# Patient Record
Sex: MALE | Race: WHITE | NOT HISPANIC OR LATINO | Employment: OTHER | URBAN - METROPOLITAN AREA
[De-identification: names, ages, dates, MRNs, and addresses within clinical notes are randomized per-mention and may not be internally consistent; named-entity substitution may affect disease eponyms.]

---

## 2023-06-28 ENCOUNTER — OFFICE VISIT (OUTPATIENT)
Dept: PRIMARY CARE | Facility: CLINIC | Age: 73
End: 2023-06-28
Payer: MEDICARE

## 2023-06-28 VITALS
WEIGHT: 162 LBS | SYSTOLIC BLOOD PRESSURE: 126 MMHG | DIASTOLIC BLOOD PRESSURE: 78 MMHG | HEART RATE: 74 BPM | HEIGHT: 70 IN | RESPIRATION RATE: 15 BRPM | TEMPERATURE: 98.2 F | BODY MASS INDEX: 23.19 KG/M2

## 2023-06-28 DIAGNOSIS — N52.9 ERECTILE DYSFUNCTION, UNSPECIFIED ERECTILE DYSFUNCTION TYPE: ICD-10-CM

## 2023-06-28 DIAGNOSIS — I10 BENIGN ESSENTIAL HTN: Primary | ICD-10-CM

## 2023-06-28 DIAGNOSIS — E11.9 TYPE 2 DIABETES MELLITUS WITHOUT COMPLICATION, WITHOUT LONG-TERM CURRENT USE OF INSULIN (MULTI): ICD-10-CM

## 2023-06-28 DIAGNOSIS — L23.7 POISON IVY DERMATITIS: ICD-10-CM

## 2023-06-28 DIAGNOSIS — R35.1 NOCTURIA: ICD-10-CM

## 2023-06-28 DIAGNOSIS — E78.5 DYSLIPIDEMIA: ICD-10-CM

## 2023-06-28 PROCEDURE — 3078F DIAST BP <80 MM HG: CPT | Performed by: INTERNAL MEDICINE

## 2023-06-28 PROCEDURE — 99214 OFFICE O/P EST MOD 30 MIN: CPT | Performed by: INTERNAL MEDICINE

## 2023-06-28 PROCEDURE — 3074F SYST BP LT 130 MM HG: CPT | Performed by: INTERNAL MEDICINE

## 2023-06-28 PROCEDURE — 1159F MED LIST DOCD IN RCRD: CPT | Performed by: INTERNAL MEDICINE

## 2023-06-28 PROCEDURE — 83036 HEMOGLOBIN GLYCOSYLATED A1C: CPT | Performed by: INTERNAL MEDICINE

## 2023-06-28 PROCEDURE — 1036F TOBACCO NON-USER: CPT | Performed by: INTERNAL MEDICINE

## 2023-06-28 PROCEDURE — 84153 ASSAY OF PSA TOTAL: CPT | Performed by: INTERNAL MEDICINE

## 2023-06-28 RX ORDER — SIMVASTATIN 40 MG/1
1 TABLET, FILM COATED ORAL EVERY EVENING
COMMUNITY
Start: 2021-01-19 | End: 2023-09-21

## 2023-06-28 RX ORDER — METFORMIN HYDROCHLORIDE 500 MG/1
1 TABLET, EXTENDED RELEASE ORAL DAILY
COMMUNITY
Start: 2020-11-10 | End: 2023-12-02 | Stop reason: SDUPTHER

## 2023-06-28 RX ORDER — METHYLPREDNISOLONE 4 MG/1
TABLET ORAL
Qty: 21 TABLET | Refills: 0 | Status: SHIPPED | OUTPATIENT
Start: 2023-06-28 | End: 2023-07-05

## 2023-06-28 RX ORDER — SILDENAFIL 50 MG/1
TABLET, FILM COATED ORAL
COMMUNITY
End: 2024-03-22 | Stop reason: SDUPTHER

## 2023-06-28 RX ORDER — TRIAMCINOLONE ACETONIDE 1 MG/G
OINTMENT TOPICAL 2 TIMES DAILY PRN
Qty: 30 G | Refills: 1 | Status: SHIPPED | OUTPATIENT
Start: 2023-06-28 | End: 2023-10-26

## 2023-06-28 RX ORDER — BISOPROLOL FUMARATE 10 MG/1
10 TABLET, FILM COATED ORAL DAILY
Qty: 30 TABLET | Refills: 3 | Status: SHIPPED | OUTPATIENT
Start: 2023-06-28 | End: 2023-08-24

## 2023-06-28 RX ORDER — NAPROXEN SODIUM 220 MG/1
TABLET ORAL
COMMUNITY
Start: 2021-01-19 | End: 2023-12-28 | Stop reason: ALTCHOICE

## 2023-06-28 ASSESSMENT — PAIN SCALES - GENERAL: PAINLEVEL: 0-NO PAIN

## 2023-06-28 NOTE — PROGRESS NOTES
Subjective   Bradly Infante is a 73 y.o. male who presents for patient is here for follow-up.  HPI  Bradly is 73-year-old male with known history of benign essential hypertension type 2 diabetes dyslipidemia erectile dysfunction and nocturia patient is here for follow-up voices no major complaint denies chest pain shortness of breath fever chills nausea vomiting constipation diarrhea dysuria urgency frequency, takes medication as prescribed.Fell Saturday missed a step climbing SUV left hill and hip pain. Pulling weeds now with Poison Ivy  Review of Systems  10 system review pertinent as above  Objective     Visit Vitals  /78   Pulse 74   Temp 36.8 °C (98.2 °F)   Resp 15      Physical Exam  HEENT: Atraumatic normocephalic the pupils are equal and round and reactive to light the sclerae nonicteric extraocular motion are intact.  Neck: Is supple without JVD no carotid bruits the trachea is midline there are no masses pulses are equal and bilateral with normal upstroke.  Skin: Normal.  Skin good texture.  Moist.  Good turgor.  No lesions, no rashes.  Lymph: No lymphadenopathy appreciated, no masses, no lesions  Lungs: Are clear to auscultation and percussion, good breath sounds bilaterally, no rhonchi, no wheezing, good diaphragmatic excursion.  Heart: Normal rate and normal rhythm S1, S2, no S3, no gallop, murmur or rub.  Abdomen: Soft, nontender, no organomegaly, good bowel sounds.    Extremities: Full range of motion, good pulses bilateral.  No cyanosis, no clubbing or edema.  Neuro: Cranial nerves II-XII are grossly intact there is no sensory or motor deficits.  Able to move all extremities.      Assessment/Plan     Here for follow-up fasting blood works    CBC BMP lipids AST ALT vitamin D 25-hydroxy  PSA    Colonoscopy completed February 2021  Cardiac calcium score compared to January 2021 low    Immunizations    Continue with the low-fat, low-cholesterol diet,  I recommended Mediterranean diet, which include  fish, chicken, vegetables and olive oil  Exercise daily for 30 minutes at least 3 times a week  Continue home medications    Diabetes  Continue your current medications  Remain on a Low carbohydrate diet, exercise daily  Check a sugar before breakfast and before dinner  On metformin take prn on his own     Hypertension  No added salt diet, do not and salt to your food  Try to exercise every other day for 30 minutes  Continue current medications    Poison Ivy dermatitis  Medrol dose pack  Triamcinolone cream     Problem List Items Addressed This Visit       Benign essential HTN - Primary    Relevant Medications    bisoprolol (Zebeta) 10 mg tablet    DM type 2 (diabetes mellitus, type 2) (CMS/[facility])    Dyslipidemia    Erectile dysfunction         Clay Otero MD

## 2023-07-07 ENCOUNTER — TELEPHONE (OUTPATIENT)
Dept: PRIMARY CARE | Facility: CLINIC | Age: 73
End: 2023-07-07
Payer: MEDICARE

## 2023-07-10 ENCOUNTER — TELEPHONE (OUTPATIENT)
Dept: PRIMARY CARE | Facility: CLINIC | Age: 73
End: 2023-07-10
Payer: MEDICARE

## 2023-08-24 DIAGNOSIS — I10 BENIGN ESSENTIAL HTN: ICD-10-CM

## 2023-08-24 RX ORDER — BISOPROLOL FUMARATE 10 MG/1
10 TABLET, FILM COATED ORAL DAILY
Qty: 90 TABLET | Refills: 2 | Status: SHIPPED | OUTPATIENT
Start: 2023-08-24 | End: 2024-03-15

## 2023-09-21 DIAGNOSIS — E11.9 TYPE 2 DIABETES MELLITUS WITHOUT COMPLICATION, WITHOUT LONG-TERM CURRENT USE OF INSULIN (MULTI): Primary | ICD-10-CM

## 2023-09-21 DIAGNOSIS — E78.5 DYSLIPIDEMIA: ICD-10-CM

## 2023-09-21 RX ORDER — SIMVASTATIN 40 MG/1
40 TABLET, FILM COATED ORAL EVERY EVENING
Qty: 90 TABLET | Refills: 3 | Status: SHIPPED | OUTPATIENT
Start: 2023-09-21 | End: 2024-03-22 | Stop reason: SDUPTHER

## 2023-11-28 NOTE — PROGRESS NOTES
Subjective   Bradly Infante is a 73 y.o. male who presents for patient is here for follow-up and Medicare 1.  HPI  Bradly is 73-year-old male with known history of benign essential hypertension type 2 diabetes dyslipidemia erectile dysfunction and nocturia patient is here for follow-up voices no major complaint denies chest pain shortness of breath fever chills nausea vomiting constipation diarrhea dysuria urgency frequency, takes medication as prescribed.Complaining of syncope while helping cleaning after thanks giving at his sons house, Syncope  and collapse no recollection of event though was helped to the couch at his own accord.      Review of Systems  10 system review pertinent as above  Objective     There were no vitals taken for this visit.     Physical Exam  HEENT: Atraumatic normocephalic the pupils are equal and round and reactive to light the sclerae nonicteric extraocular motion are intact.  Neck: Is supple without JVD no carotid bruits the trachea is midline there are no masses pulses are equal and bilateral with normal upstroke.  Skin: Normal.  Skin good texture.  Moist.  Good turgor.  No lesions, no rashes.  Lymph: No lymphadenopathy appreciated, no masses, no lesions  Lungs: Are clear to auscultation and percussion, good breath sounds bilaterally, no rhonchi, no wheezing, good diaphragmatic excursion.  Heart: Normal rate and normal rhythm S1, S2, no S3, no gallop, murmur or rub.  Abdomen: Soft, nontender, no organomegaly, good bowel sounds.    Extremities: Full range of motion, good pulses bilateral.  No cyanosis, no clubbing or edema.  Neuro: Cranial nerves II-XII are grossly intact there is no sensory or motor deficits.  Able to move all extremities.      Assessment/Plan     Is here for follow-up and fasting blood work     CBC BMP lipids AST ALT vitamin D 25-hydroxy    Syncope and collapse  Possible dehydrated in the setting  Of Polydipsia polyuria type 2 diabetes (occasionally forgets  Metformin)  Out for a few seconds  No recollection of event  TTE, ZIO, Carotid us, BMP Mag TSH    ECG sinus bradycardia  Holter monitor ordered    Losing weight  In the setting of polydipsia and polyuria  I suspect poorly controlled glucose   Will check BMP A!C  Currently on Metformin  mg at Bedtime ( diarrhea with 3 times daily did not try twice)  Farxiga Rx sent in will take if Glucose is High    Colonoscopy completed February 2021  Cardiac calcium score compared to January 2021 low    Immunizations    Continue with the low-fat, low-cholesterol diet,  I recommended Mediterranean diet, which include fish, chicken, vegetables and olive oil  Exercise daily for 30 minutes at least 3 times a week  Continue home medications    Diabetes  Continue your current medications  Remain on a Low carbohydrate diet, exercise daily  Check a sugar before breakfast and before dinner  On metformin take prn on his own     Hypertension  No added salt diet, do not and salt to your food  Try to exercise every other day for 30 minutes  Continue current medications    Poison Ivy dermatitis  Medrol dose pack  Triamcinolone cream     Problem List Items Addressed This Visit       DM type 2 (diabetes mellitus, type 2) (CMS/[facility])    Relevant Medications    dapagliflozin propanediol (Farxiga) 10 mg    Other Relevant Orders    Hemoglobin A1C     Other Visit Diagnoses       Syncope and collapse    -  Primary    Relevant Orders    Transthoracic Echo (TTE) Complete    Vascular US carotid artery duplex bilateral    Holter or Event Cardiac Monitor    Basic Metabolic Panel    Magnesium    TSH    ECG 12 lead (Clinic Performed)    Tiredness        Relevant Orders    TSH    Routine general medical examination at health care facility                Clay Otero MD

## 2023-11-30 ENCOUNTER — OFFICE VISIT (OUTPATIENT)
Dept: PRIMARY CARE | Facility: CLINIC | Age: 73
End: 2023-11-30
Payer: MEDICARE

## 2023-11-30 VITALS — BODY MASS INDEX: 22.05 KG/M2 | HEIGHT: 70 IN | WEIGHT: 154 LBS

## 2023-11-30 DIAGNOSIS — Z00.00 ROUTINE GENERAL MEDICAL EXAMINATION AT HEALTH CARE FACILITY: ICD-10-CM

## 2023-11-30 DIAGNOSIS — R55 SYNCOPE AND COLLAPSE: Primary | ICD-10-CM

## 2023-11-30 DIAGNOSIS — E11.9 TYPE 2 DIABETES MELLITUS WITHOUT COMPLICATION, WITHOUT LONG-TERM CURRENT USE OF INSULIN (MULTI): ICD-10-CM

## 2023-11-30 DIAGNOSIS — R53.83 TIREDNESS: ICD-10-CM

## 2023-11-30 LAB — MAGNESIUM SERPL-MCNC: 2.28 MG/DL (ref 1.6–2.4)

## 2023-11-30 PROCEDURE — 36415 COLL VENOUS BLD VENIPUNCTURE: CPT

## 2023-11-30 PROCEDURE — 84443 ASSAY THYROID STIM HORMONE: CPT | Performed by: INTERNAL MEDICINE

## 2023-11-30 PROCEDURE — 1170F FXNL STATUS ASSESSED: CPT | Performed by: INTERNAL MEDICINE

## 2023-11-30 PROCEDURE — 3046F HEMOGLOBIN A1C LEVEL >9.0%: CPT | Performed by: INTERNAL MEDICINE

## 2023-11-30 PROCEDURE — 99214 OFFICE O/P EST MOD 30 MIN: CPT | Performed by: INTERNAL MEDICINE

## 2023-11-30 PROCEDURE — 93000 ELECTROCARDIOGRAM COMPLETE: CPT | Performed by: INTERNAL MEDICINE

## 2023-11-30 PROCEDURE — 80048 BASIC METABOLIC PNL TOTAL CA: CPT | Performed by: INTERNAL MEDICINE

## 2023-11-30 PROCEDURE — 1126F AMNT PAIN NOTED NONE PRSNT: CPT | Performed by: INTERNAL MEDICINE

## 2023-11-30 PROCEDURE — G0439 PPPS, SUBSEQ VISIT: HCPCS | Performed by: INTERNAL MEDICINE

## 2023-11-30 PROCEDURE — 1036F TOBACCO NON-USER: CPT | Performed by: INTERNAL MEDICINE

## 2023-11-30 PROCEDURE — 1159F MED LIST DOCD IN RCRD: CPT | Performed by: INTERNAL MEDICINE

## 2023-11-30 PROCEDURE — 83735 ASSAY OF MAGNESIUM: CPT

## 2023-11-30 RX ORDER — DAPAGLIFLOZIN 10 MG/1
10 TABLET, FILM COATED ORAL DAILY
Qty: 30 TABLET | Refills: 1 | Status: SHIPPED | OUTPATIENT
Start: 2023-11-30 | End: 2023-12-19 | Stop reason: ALTCHOICE

## 2023-11-30 ASSESSMENT — ANXIETY QUESTIONNAIRES
IF YOU CHECKED OFF ANY PROBLEMS ON THIS QUESTIONNAIRE, HOW DIFFICULT HAVE THESE PROBLEMS MADE IT FOR YOU TO DO YOUR WORK, TAKE CARE OF THINGS AT HOME, OR GET ALONG WITH OTHER PEOPLE: NOT DIFFICULT AT ALL
5. BEING SO RESTLESS THAT IT IS HARD TO SIT STILL: NOT AT ALL
4. TROUBLE RELAXING: NOT AT ALL
1. FEELING NERVOUS, ANXIOUS, OR ON EDGE: NOT AT ALL
GAD7 TOTAL SCORE: 0
3. WORRYING TOO MUCH ABOUT DIFFERENT THINGS: NOT AT ALL
7. FEELING AFRAID AS IF SOMETHING AWFUL MIGHT HAPPEN: NOT AT ALL
2. NOT BEING ABLE TO STOP OR CONTROL WORRYING: NOT AT ALL
6. BECOMING EASILY ANNOYED OR IRRITABLE: NOT AT ALL

## 2023-11-30 ASSESSMENT — ACTIVITIES OF DAILY LIVING (ADL)
TOILETING: INDEPENDENT
TOILETING: INDEPENDENT
PREPARING MEALS: INDEPENDENT
NEEDS ASSISTANCE WITH FOOD: INDEPENDENT
FEEDING: INDEPENDENT
DRESSING: INDEPENDENT
HEARING - RIGHT EAR: FUNCTIONAL
HEARING - LEFT EAR: FUNCTIONAL
USING TELEPHONE: INDEPENDENT
FEEDING YOURSELF: INDEPENDENT
PILL BOX USED: NO
GROOMING: INDEPENDENT
DRESSING YOURSELF: INDEPENDENT
EATING: INDEPENDENT
PATIENT'S MEMORY ADEQUATE TO SAFELY COMPLETE DAILY ACTIVITIES?: YES
DOING HOUSEWORK: INDEPENDENT
JUDGMENT_ADEQUATE_SAFELY_COMPLETE_DAILY_ACTIVITIES: YES
GROCERY SHOPPING: INDEPENDENT
USING TRANSPORTATION: INDEPENDENT
BATHING: INDEPENDENT
JUDGMENT_ADEQUATE_SAFELY_COMPLETE_DAILY_ACTIVITIES: YES
STIL DRIVING: YES
BATHING: INDEPENDENT
WALKS IN HOME: INDEPENDENT
TAKING MEDICATION: INDEPENDENT
ADEQUATE_TO_COMPLETE_ADL: YES
ADEQUATE_TO_COMPLETE_ADL: YES

## 2023-11-30 ASSESSMENT — GERIATRIC MINI NUTRITIONAL ASSESSMENT (MNA)
B WEIGHT LOSS DURING THE LAST 3 MONTHS: NO WEIGHT LOSS
D HAS SUFFERED PSYCHOLOGICAL STRESS OR ACUTE DISEASE IN THE PAST 3 MONTHS?: NO
C GENERAL MOBILITY: GOES OUT

## 2023-11-30 ASSESSMENT — COLUMBIA-SUICIDE SEVERITY RATING SCALE - C-SSRS
2. HAVE YOU ACTUALLY HAD ANY THOUGHTS OF KILLING YOURSELF?: NO
1. IN THE PAST MONTH, HAVE YOU WISHED YOU WERE DEAD OR WISHED YOU COULD GO TO SLEEP AND NOT WAKE UP?: NO
6. HAVE YOU EVER DONE ANYTHING, STARTED TO DO ANYTHING, OR PREPARED TO DO ANYTHING TO END YOUR LIFE?: NO

## 2023-11-30 ASSESSMENT — ENCOUNTER SYMPTOMS
OCCASIONAL FEELINGS OF UNSTEADINESS: 0
DEPRESSION: 0
LOSS OF SENSATION IN FEET: 0

## 2023-11-30 ASSESSMENT — PATIENT HEALTH QUESTIONNAIRE - PHQ9
SUM OF ALL RESPONSES TO PHQ9 QUESTIONS 1 AND 2: 0
2. FEELING DOWN, DEPRESSED OR HOPELESS: NOT AT ALL
1. LITTLE INTEREST OR PLEASURE IN DOING THINGS: NOT AT ALL

## 2023-11-30 NOTE — PROGRESS NOTES
"Subjective   Reason for Visit: Bradly Infante is an 73 y.o. male here for a Medicare Wellness visit.          Reviewed all medications by prescribing practitioner or clinical pharmacist (such as prescriptions, OTCs, herbal therapies and supplements) and documented in the medical record.    HPI  Bradly is 73, lives at home independently, self-sufficient takes his medication as prescribed, is a recent episode of a fall versus syncope, denies fever chills cough nausea vomiting, denies additional history of falling except for 1 time over Thanksgiving 2023.  Patient Care Team:  Clay Otero MD as PCP - General (Internal Medicine)  Clay Otero MD as PCP - [employer] PCP     Review of Systems  10 systems reviewed  Objective   Vitals:  Ht 1.778 m (5' 10\")   Wt 69.9 kg (154 lb)   BMI 22.10 kg/m²       Physical Exam  HEENT: Atraumatic normocephalic the pupils are equal and round and reactive to light the sclerae nonicteric extraocular motion are intact.  Neck: Is supple without JVD no carotid bruits the trachea is midline there are no masses pulses are equal and bilateral with normal upstroke.  Skin: Normal.  Skin good texture.  Moist.  Good turgor.  No lesions, no rashes.  Lymph: No lymphadenopathy appreciated, no masses, no lesions  Lungs: Are clear to auscultation and percussion, good breath sounds bilaterally, no rhonchi, no wheezing, good diaphragmatic excursion.  Heart: Normal rate and normal rhythm S1, S2, no S3, no gallop, murmur or rub.  Abdomen: Soft, nontender, no organomegaly, good bowel sounds.    Extremities: Full range of motion, good pulses bilateral.  No cyanosis, no clubbing or edema.  Neuro: Cranial nerves II-XII are grossly intact there is no sensory or motor deficits.  Able to move all extremities.  Assessment/Plan     Medicare wellness  Problem List Items Addressed This Visit       DM type 2 (diabetes mellitus, type 2) (CMS/[facility])    Relevant Medications    dapagliflozin propanediol " (Farxiga) 10 mg    Other Relevant Orders    Hemoglobin A1C     Other Visit Diagnoses       Syncope and collapse    -  Primary    Relevant Orders    Transthoracic Echo (TTE) Complete    Vascular US carotid artery duplex bilateral    Holter or Event Cardiac Monitor    Basic Metabolic Panel    Magnesium    TSH    ECG 12 lead (Clinic Performed)    Tiredness        Relevant Orders    TSH

## 2023-12-01 PROCEDURE — 36415 COLL VENOUS BLD VENIPUNCTURE: CPT

## 2023-12-01 PROCEDURE — 83036 HEMOGLOBIN GLYCOSYLATED A1C: CPT

## 2023-12-02 DIAGNOSIS — E11.9 TYPE 2 DIABETES MELLITUS WITHOUT COMPLICATION, WITHOUT LONG-TERM CURRENT USE OF INSULIN (MULTI): Primary | ICD-10-CM

## 2023-12-02 LAB
EST. AVERAGE GLUCOSE BLD GHB EST-MCNC: 255 MG/DL
HBA1C MFR BLD: 10.5 %

## 2023-12-02 RX ORDER — METFORMIN HYDROCHLORIDE 500 MG/1
1000 TABLET, EXTENDED RELEASE ORAL DAILY
Qty: 60 TABLET | Refills: 11 | Status: SHIPPED | OUTPATIENT
Start: 2023-12-02 | End: 2024-01-04

## 2023-12-05 ENCOUNTER — HOSPITAL ENCOUNTER (OUTPATIENT)
Dept: CARDIOLOGY | Facility: HOSPITAL | Age: 73
Discharge: HOME | End: 2023-12-05
Payer: MEDICARE

## 2023-12-05 DIAGNOSIS — R55 SYNCOPE AND COLLAPSE: ICD-10-CM

## 2023-12-05 PROCEDURE — 93242 EXT ECG>48HR<7D RECORDING: CPT

## 2023-12-07 ENCOUNTER — HOSPITAL ENCOUNTER (OUTPATIENT)
Dept: RADIOLOGY | Facility: HOSPITAL | Age: 73
Discharge: HOME | End: 2023-12-07
Payer: MEDICARE

## 2023-12-07 DIAGNOSIS — R55 SYNCOPE AND COLLAPSE: ICD-10-CM

## 2023-12-07 PROCEDURE — 93880 EXTRACRANIAL BILAT STUDY: CPT

## 2023-12-08 ENCOUNTER — HOSPITAL ENCOUNTER (OUTPATIENT)
Dept: CARDIOLOGY | Facility: HOSPITAL | Age: 73
Discharge: HOME | End: 2023-12-08
Payer: MEDICARE

## 2023-12-08 DIAGNOSIS — R55 SYNCOPE AND COLLAPSE: ICD-10-CM

## 2023-12-08 PROCEDURE — 93306 TTE W/DOPPLER COMPLETE: CPT

## 2023-12-08 PROCEDURE — 93306 TTE W/DOPPLER COMPLETE: CPT | Performed by: INTERNAL MEDICINE

## 2023-12-14 LAB
AORTIC VALVE PEAK VELOCITY: 1.24
AV PEAK GRADIENT: 6.2
AVA (PEAK VEL): 2.27
EJECTION FRACTION APICAL 4 CHAMBER: 58.1
LEFT ATRIUM VOLUME AREA LENGTH INDEX BSA: 20.1
LEFT VENTRICLE INTERNAL DIMENSION DIASTOLE: 3.91 (ref 3.5–6)
LEFT VENTRICULAR OUTFLOW TRACT DIAMETER: 1.9
MITRAL VALVE E/A RATIO: 1.13
MITRAL VALVE E/E' RATIO: 9.95
RIGHT VENTRICLE FREE WALL PEAK S': 14.4
RIGHT VENTRICLE PEAK SYSTOLIC PRESSURE: 26.2
TRICUSPID ANNULAR PLANE SYSTOLIC EXCURSION: 2.3

## 2023-12-19 NOTE — PROGRESS NOTES
Patient is instructed to stop Farxiga, take Jardiance.  Continue metformin.  And come in for fasting blood work.

## 2023-12-27 ENCOUNTER — APPOINTMENT (OUTPATIENT)
Dept: PRIMARY CARE | Facility: CLINIC | Age: 73
End: 2023-12-27
Payer: MEDICARE

## 2023-12-28 ENCOUNTER — OFFICE VISIT (OUTPATIENT)
Dept: PRIMARY CARE | Facility: CLINIC | Age: 73
End: 2023-12-28
Payer: MEDICARE

## 2023-12-28 ENCOUNTER — APPOINTMENT (OUTPATIENT)
Dept: PRIMARY CARE | Facility: CLINIC | Age: 73
End: 2023-12-28
Payer: MEDICARE

## 2023-12-28 VITALS
TEMPERATURE: 98.1 F | HEIGHT: 70 IN | HEART RATE: 78 BPM | RESPIRATION RATE: 16 BRPM | SYSTOLIC BLOOD PRESSURE: 122 MMHG | DIASTOLIC BLOOD PRESSURE: 80 MMHG | WEIGHT: 155 LBS | BODY MASS INDEX: 22.19 KG/M2

## 2023-12-28 DIAGNOSIS — E78.5 DYSLIPIDEMIA: ICD-10-CM

## 2023-12-28 DIAGNOSIS — I10 BENIGN ESSENTIAL HTN: ICD-10-CM

## 2023-12-28 DIAGNOSIS — E11.9 TYPE 2 DIABETES MELLITUS WITHOUT COMPLICATION, WITHOUT LONG-TERM CURRENT USE OF INSULIN (MULTI): Primary | ICD-10-CM

## 2023-12-28 DIAGNOSIS — I10 PRIMARY HYPERTENSION: ICD-10-CM

## 2023-12-28 PROCEDURE — 1036F TOBACCO NON-USER: CPT | Performed by: INTERNAL MEDICINE

## 2023-12-28 PROCEDURE — 3079F DIAST BP 80-89 MM HG: CPT | Performed by: INTERNAL MEDICINE

## 2023-12-28 PROCEDURE — 80048 BASIC METABOLIC PNL TOTAL CA: CPT | Performed by: INTERNAL MEDICINE

## 2023-12-28 PROCEDURE — 1126F AMNT PAIN NOTED NONE PRSNT: CPT | Performed by: INTERNAL MEDICINE

## 2023-12-28 PROCEDURE — 1159F MED LIST DOCD IN RCRD: CPT | Performed by: INTERNAL MEDICINE

## 2023-12-28 PROCEDURE — 3046F HEMOGLOBIN A1C LEVEL >9.0%: CPT | Performed by: INTERNAL MEDICINE

## 2023-12-28 PROCEDURE — 99214 OFFICE O/P EST MOD 30 MIN: CPT | Performed by: INTERNAL MEDICINE

## 2023-12-28 PROCEDURE — 3074F SYST BP LT 130 MM HG: CPT | Performed by: INTERNAL MEDICINE

## 2023-12-28 ASSESSMENT — PAIN SCALES - GENERAL: PAINLEVEL: 0-NO PAIN

## 2023-12-28 ASSESSMENT — ENCOUNTER SYMPTOMS
LOSS OF SENSATION IN FEET: 0
OCCASIONAL FEELINGS OF UNSTEADINESS: 0
DEPRESSION: 0

## 2023-12-31 DIAGNOSIS — E11.9 TYPE 2 DIABETES MELLITUS WITHOUT COMPLICATION, WITHOUT LONG-TERM CURRENT USE OF INSULIN (MULTI): ICD-10-CM

## 2024-01-04 RX ORDER — METFORMIN HYDROCHLORIDE 500 MG/1
500 TABLET, EXTENDED RELEASE ORAL DAILY
Qty: 90 TABLET | Refills: 3 | Status: SHIPPED | OUTPATIENT
Start: 2024-01-04

## 2024-03-14 DIAGNOSIS — I10 BENIGN ESSENTIAL HTN: ICD-10-CM

## 2024-03-15 RX ORDER — BISOPROLOL FUMARATE 10 MG/1
10 TABLET, FILM COATED ORAL DAILY
Qty: 90 TABLET | Refills: 3 | Status: SHIPPED | OUTPATIENT
Start: 2024-03-15

## 2024-03-20 NOTE — PROGRESS NOTES
Subjective   Bradly Infante is a 73 y.o. male who presents for patient is here for diabetes follow-up blood work  HPI  Bradly is 73-year-old male with known history of benign essential hypertension type 2 diabetes dyslipidemia erectile dysfunction and nocturia patient is here for follow-up voices no major complaint denies chest pain shortness of breath fever chills nausea vomiting constipation diarrhea dysuria urgency frequency, takes medication as prescribed.Complaining of syncope while helping cleaning after thanks giving at his sons house, Syncope  and collapse no recollection of event though was helped to the couch at his own accord.      Review of Systems  10 system review pertinent as above  Objective     Visit Vitals  /82   Pulse 78   Temp 36.5 °C (97.7 °F)   Resp 15          Physical Exam  HEENT: Atraumatic normocephalic the pupils are equal and round and reactive to light the sclerae nonicteric extraocular motion are intact.  Neck: Is supple without JVD no carotid bruits the trachea is midline there are no masses pulses are equal and bilateral with normal upstroke.  Skin: Normal.  Skin good texture.  Moist.  Good turgor.  No lesions, no rashes.  Lymph: No lymphadenopathy appreciated, no masses, no lesions  Lungs: Are clear to auscultation and percussion, good breath sounds bilaterally, no rhonchi, no wheezing, good diaphragmatic excursion.  Heart: Normal rate and normal rhythm S1, S2, no S3, no gallop, murmur or rub.  Abdomen: Soft, nontender, no organomegaly, good bowel sounds.    Extremities: Full range of motion, good pulses bilateral.  No cyanosis, no clubbing or edema.  Neuro: Cranial nerves II-XII are grossly intact there is no sensory or motor deficits.  Able to move all extremities.      Assessment/Plan     Is here for follow-up and fasting blood work       BMP  A1c  LIPID    PSA 06/2023 next Misa 3 2024  Colonoscopy 2021 Feb tubular Adenoma repeat in 5 years Ie Feb 2026      Diabetes  Continue your current medications  Remain on a Low carbohydrate diet, exercise daily  Check a sugar before breakfast and before dinner      Weight loss in the setting of diabetes I  Metformin  BID  Jardiance 10 mg daily    Colonoscopy completed February 2021  Cardiac calcium score compared to January 2021 low    Immunizations    Continue with the low-fat, low-cholesterol diet,  I recommended Mediterranean diet, which include fish, chicken, vegetables and olive oil  Exercise daily for 30 minutes at least 3 times a week  Continue home medications    Diabetes  Continue your current medications  Remain on a Low carbohydrate diet, exercise daily  Check a sugar before breakfast and before dinner  On metformin take prn on his own   A1c 10.5% 12/01/2023    Hypertension  No added salt diet, do not and salt to your food  Try to exercise every other day for 30 minutes  Continue current medications    Poison Ivy dermatitis  Medrol dose pack  Triamcinolone cream     Problem List Items Addressed This Visit       Benign essential HTN - Primary    DM type 2 (diabetes mellitus, type 2) (CMS/[facility])    Relevant Medications    empagliflozin (Jardiance) 10 mg    Other Relevant Orders    Basic Metabolic Panel    Hemoglobin A1C    Dyslipidemia    Relevant Medications    simvastatin (Zocor) 40 mg tablet    Other Relevant Orders    Lipid Panel    Erectile dysfunction    Relevant Medications    sildenafil (Viagra) 100 mg tablet    Hypertensive disorder     Clay Otero MD

## 2024-03-22 ENCOUNTER — OFFICE VISIT (OUTPATIENT)
Dept: PRIMARY CARE | Facility: CLINIC | Age: 74
End: 2024-03-22
Payer: MEDICARE

## 2024-03-22 VITALS
HEART RATE: 78 BPM | SYSTOLIC BLOOD PRESSURE: 126 MMHG | HEIGHT: 70 IN | WEIGHT: 156 LBS | RESPIRATION RATE: 15 BRPM | BODY MASS INDEX: 22.33 KG/M2 | DIASTOLIC BLOOD PRESSURE: 82 MMHG | TEMPERATURE: 97.7 F

## 2024-03-22 DIAGNOSIS — I10 PRIMARY HYPERTENSION: ICD-10-CM

## 2024-03-22 DIAGNOSIS — E78.5 DYSLIPIDEMIA: ICD-10-CM

## 2024-03-22 DIAGNOSIS — E11.9 TYPE 2 DIABETES MELLITUS WITHOUT COMPLICATION, WITHOUT LONG-TERM CURRENT USE OF INSULIN (MULTI): ICD-10-CM

## 2024-03-22 DIAGNOSIS — I10 BENIGN ESSENTIAL HTN: Primary | ICD-10-CM

## 2024-03-22 DIAGNOSIS — N52.9 ERECTILE DYSFUNCTION, UNSPECIFIED ERECTILE DYSFUNCTION TYPE: ICD-10-CM

## 2024-03-22 PROCEDURE — 3074F SYST BP LT 130 MM HG: CPT | Performed by: INTERNAL MEDICINE

## 2024-03-22 PROCEDURE — 3079F DIAST BP 80-89 MM HG: CPT | Performed by: INTERNAL MEDICINE

## 2024-03-22 PROCEDURE — 1159F MED LIST DOCD IN RCRD: CPT | Performed by: INTERNAL MEDICINE

## 2024-03-22 PROCEDURE — 80061 LIPID PANEL: CPT | Performed by: INTERNAL MEDICINE

## 2024-03-22 PROCEDURE — 83036 HEMOGLOBIN GLYCOSYLATED A1C: CPT | Performed by: INTERNAL MEDICINE

## 2024-03-22 PROCEDURE — 99214 OFFICE O/P EST MOD 30 MIN: CPT | Performed by: INTERNAL MEDICINE

## 2024-03-22 PROCEDURE — 1036F TOBACCO NON-USER: CPT | Performed by: INTERNAL MEDICINE

## 2024-03-22 PROCEDURE — 80048 BASIC METABOLIC PNL TOTAL CA: CPT | Performed by: INTERNAL MEDICINE

## 2024-03-22 PROCEDURE — 1126F AMNT PAIN NOTED NONE PRSNT: CPT | Performed by: INTERNAL MEDICINE

## 2024-03-22 RX ORDER — SIMVASTATIN 40 MG/1
40 TABLET, FILM COATED ORAL EVERY EVENING
Qty: 90 TABLET | Refills: 3 | Status: SHIPPED | OUTPATIENT
Start: 2024-03-22

## 2024-03-22 RX ORDER — SILDENAFIL 100 MG/1
100 TABLET, FILM COATED ORAL DAILY PRN
Qty: 30 TABLET | Refills: 1 | Status: SHIPPED | OUTPATIENT
Start: 2024-03-22 | End: 2024-05-21

## 2024-03-22 RX ORDER — SILDENAFIL 50 MG/1
100 TABLET, FILM COATED ORAL AS NEEDED
Qty: 30 TABLET | Refills: 0 | Status: SHIPPED | OUTPATIENT
Start: 2024-03-22 | End: 2024-03-22 | Stop reason: ALTCHOICE

## 2024-03-22 ASSESSMENT — PAIN SCALES - GENERAL: PAINLEVEL: 0-NO PAIN

## 2024-03-22 ASSESSMENT — ENCOUNTER SYMPTOMS
DEPRESSION: 0
OCCASIONAL FEELINGS OF UNSTEADINESS: 0
LOSS OF SENSATION IN FEET: 0

## 2024-04-02 DIAGNOSIS — E11.9 TYPE 2 DIABETES MELLITUS WITHOUT COMPLICATION, WITHOUT LONG-TERM CURRENT USE OF INSULIN (MULTI): ICD-10-CM

## 2024-06-19 NOTE — PROGRESS NOTES
Subjective   Bradly Infante is a 74 y.o. male who presents for patient is here for diabetes follow-up .  WINDY Abdullahi is 74-year-old male with known history of benign essential hypertension type 2 diabetes dyslipidemia erectile dysfunction and nocturia patient is here for follow-up voices no major complaint denies chest pain shortness of breath fever chills nausea vomiting constipation diarrhea dysuria urgency frequency, takes medication as prescribed. Patient is working out 3 X per week, plays [employer]        Review of Systems  10 system review pertinent as above  Objective     Visit Vitals  /78   Pulse 78   Temp 36.5 °C (97.7 °F)   Resp 16        Physical Exam  HEENT: Atraumatic normocephalic the pupils are equal and round and reactive to light the sclerae nonicteric extraocular motion are intact.  Neck: Is supple without JVD no carotid bruits the trachea is midline there are no masses pulses are equal and bilateral with normal upstroke.  Skin: Normal.  Skin good texture.  Moist.  Good turgor.  No lesions, no rashes.  Lymph: No lymphadenopathy appreciated, no masses, no lesions  Lungs: Are clear to auscultation and percussion, good breath sounds bilaterally, no rhonchi, no wheezing, good diaphragmatic excursion.  Heart: Normal rate and normal rhythm S1, S2, no S3, no gallop, murmur or rub.  Abdomen: Soft, nontender, no organomegaly, good bowel sounds.    Extremities: Full range of motion, good pulses bilateral.  No cyanosis, no clubbing or edema.  Neuro: Cranial nerves II-XII are grossly intact there is no sensory or motor deficits.  Able to move all extremities.      Assessment/Plan     Follow up  Patient is fasting  Blood work    BMP  A1c  LIPID    PSA 06/2023 next Misa 3 2024  Colonoscopy 2021 Feb tubular Adenoma repeat in 5 years Ie Feb 2026     Diabetes  Continue your current medications  Remain on a Low carbohydrate diet, exercise daily  Check a sugar before breakfast and before  dinner    DM2  Weight loss in the setting of diabetes I  Metformin  BID  Jardiance 10 mg daily    Colonoscopy completed February 2021  Cardiac calcium score compared to January 2021 low    Immunizations    Continue with the low-fat, low-cholesterol diet,  I recommended Mediterranean diet, which include fish, chicken, vegetables and olive oil  Exercise daily for 30 minutes at least 3 times a week  Continue home medications  CACS 5.6 02/02/21     Diabetes  Continue your current medications  Remain on a Low carbohydrate diet, exercise daily  Check a sugar before breakfast and before dinner  On metformin take prn on his own   A1c 10.5% 12/01/2023    Hypertension  No added salt diet, do not and salt to your food  Try to exercise every other day for 30 minutes  Continue current medications    Poison Ivy dermatitis  Medrol dose pack  Triamcinolone cream     Problem List Items Addressed This Visit       Benign essential HTN    Relevant Orders    CBC    DM type 2 (diabetes mellitus, type 2) (Multi) - Primary    Relevant Orders    Basic Metabolic Panel    Hemoglobin A1C    CBC    PSA    Dyslipidemia    Relevant Orders    AST    ALT    Lipid Panel    Nocturia     Other Visit Diagnoses       Nocturia associated with benign prostatic hyperplasia        Relevant Orders    PSA            Clay Otero MD

## 2024-06-24 ENCOUNTER — APPOINTMENT (OUTPATIENT)
Dept: PRIMARY CARE | Facility: CLINIC | Age: 74
End: 2024-06-24
Payer: MEDICARE

## 2024-06-24 VITALS
SYSTOLIC BLOOD PRESSURE: 122 MMHG | RESPIRATION RATE: 16 BRPM | BODY MASS INDEX: 22.05 KG/M2 | WEIGHT: 154 LBS | HEART RATE: 78 BPM | DIASTOLIC BLOOD PRESSURE: 78 MMHG | HEIGHT: 70 IN | TEMPERATURE: 97.7 F

## 2024-06-24 DIAGNOSIS — R35.1 NOCTURIA: ICD-10-CM

## 2024-06-24 DIAGNOSIS — R35.1 NOCTURIA ASSOCIATED WITH BENIGN PROSTATIC HYPERPLASIA: ICD-10-CM

## 2024-06-24 DIAGNOSIS — E11.9 TYPE 2 DIABETES MELLITUS WITHOUT COMPLICATION, WITHOUT LONG-TERM CURRENT USE OF INSULIN (MULTI): Primary | ICD-10-CM

## 2024-06-24 DIAGNOSIS — E78.5 DYSLIPIDEMIA: ICD-10-CM

## 2024-06-24 DIAGNOSIS — N40.1 NOCTURIA ASSOCIATED WITH BENIGN PROSTATIC HYPERPLASIA: ICD-10-CM

## 2024-06-24 DIAGNOSIS — I10 BENIGN ESSENTIAL HTN: ICD-10-CM

## 2024-06-24 PROCEDURE — 1036F TOBACCO NON-USER: CPT | Performed by: INTERNAL MEDICINE

## 2024-06-24 PROCEDURE — 80048 BASIC METABOLIC PNL TOTAL CA: CPT | Performed by: INTERNAL MEDICINE

## 2024-06-24 PROCEDURE — 84153 ASSAY OF PSA TOTAL: CPT | Performed by: INTERNAL MEDICINE

## 2024-06-24 PROCEDURE — 83036 HEMOGLOBIN GLYCOSYLATED A1C: CPT | Performed by: INTERNAL MEDICINE

## 2024-06-24 PROCEDURE — 80061 LIPID PANEL: CPT | Performed by: INTERNAL MEDICINE

## 2024-06-24 PROCEDURE — 1126F AMNT PAIN NOTED NONE PRSNT: CPT | Performed by: INTERNAL MEDICINE

## 2024-06-24 PROCEDURE — 99214 OFFICE O/P EST MOD 30 MIN: CPT | Performed by: INTERNAL MEDICINE

## 2024-06-24 PROCEDURE — 3074F SYST BP LT 130 MM HG: CPT | Performed by: INTERNAL MEDICINE

## 2024-06-24 PROCEDURE — 84450 TRANSFERASE (AST) (SGOT): CPT | Performed by: INTERNAL MEDICINE

## 2024-06-24 PROCEDURE — G2211 COMPLEX E/M VISIT ADD ON: HCPCS | Performed by: INTERNAL MEDICINE

## 2024-06-24 PROCEDURE — 1159F MED LIST DOCD IN RCRD: CPT | Performed by: INTERNAL MEDICINE

## 2024-06-24 PROCEDURE — 3078F DIAST BP <80 MM HG: CPT | Performed by: INTERNAL MEDICINE

## 2024-06-24 PROCEDURE — 85025 COMPLETE CBC W/AUTO DIFF WBC: CPT | Performed by: INTERNAL MEDICINE

## 2024-06-24 PROCEDURE — 84460 ALANINE AMINO (ALT) (SGPT): CPT | Performed by: INTERNAL MEDICINE

## 2024-06-24 ASSESSMENT — PAIN SCALES - GENERAL: PAINLEVEL: 0-NO PAIN

## 2024-06-24 ASSESSMENT — ENCOUNTER SYMPTOMS
DEPRESSION: 0
OCCASIONAL FEELINGS OF UNSTEADINESS: 0
LOSS OF SENSATION IN FEET: 0

## 2024-07-26 NOTE — PROGRESS NOTES
Subjective   Bradly Infante is a 74 y.o. male who presents for patient is here for diabetes Muscle and joint pain .  HPI  Bradly is 74-year-old male with known history of benign essential hypertension type 2 diabetes dyslipidemia erectile dysfunction and nocturia patient is here for follow-up voices no major complaint denies chest pain shortness of breath fever chills nausea vomiting constipation diarrhea dysuria urgency frequency, takes medication as prescribed. Patient was working out 3 X per week, plays [employer] ball now down to once a week, muscle and joint pain.        Review of Systems  10 system review pertinent as above  Objective     Visit Vitals  /82   Pulse 78   Temp 36.6 °C (97.9 °F)   Resp 15          Physical Exam  HEENT: Atraumatic normocephalic the pupils are equal and round and reactive to light the sclerae nonicteric extraocular motion are intact.  Neck: Is supple without JVD no carotid bruits the trachea is midline there are no masses pulses are equal and bilateral with normal upstroke.  Skin: Normal.  Skin good texture.  Moist.  Good turgor.  No lesions, no rashes.  Lymph: No lymphadenopathy appreciated, no masses, no lesions  Lungs: Are clear to auscultation and percussion, good breath sounds bilaterally, no rhonchi, no wheezing, good diaphragmatic excursion.  Heart: Normal rate and normal rhythm S1, S2, no S3, no gallop, murmur or rub.  Abdomen: Soft, nontender, no organomegaly, good bowel sounds.    Extremities: Full range of motion, good pulses bilateral.  No cyanosis, no clubbing or edema.  Neuro: Cranial nerves II-XII are grossly intact there is no sensory or motor deficits.  Able to move all extremities.      Assessment/Plan     Follow up  Muscle pains aches  Most likely Due to statins  Stop Statins ( Simvastatin)  Take Coq10 200 mg daily   Stop whenpain is gone      RX for Rapatha  In the settings  Of   Statin induced Myositis  Dyslipidemia  DM2  Pos CACS Score    PSA 06/2023 next  Misa 3 2024  Colonoscopy 2021 Feb tubular Adenoma repeat in 5 years Ie Feb 2026     Diabetes  Continue your current medications  Remain on a Low carbohydrate diet, exercise daily  Check a sugar before breakfast and before dinner    DM2  Weight loss in the setting of diabetes I  Metformin  BID  Jardiance 10 mg daily    Colonoscopy completed February 2021  Cardiac calcium score compared to January 2021 low    Immunizations    Continue with the low-fat, low-cholesterol diet,  I recommended Mediterranean diet, which include fish, chicken, vegetables and olive oil  Exercise daily for 30 minutes at least 3 times a week  Continue home medications  CACS 5.6 02/02/21     Diabetes  Continue your current medications  Remain on a Low carbohydrate diet, exercise daily  Check a sugar before breakfast and before dinner  On metformin take prn on his own   A1c 10.5% 12/01/2023    Hypertension  No added salt diet, do not and salt to your food  Try to exercise every other day for 30 minutes  Continue current medications    Poison Ivy dermatitis  Medrol dose pack  Triamcinolone cream     Problem List Items Addressed This Visit       Dyslipidemia - Primary    Relevant Medications    evolocumab (Repatha SureClick) 140 mg/mL injection     Other Visit Diagnoses       Statin-induced myositis        Relevant Medications    evolocumab (Repatha SureClick) 140 mg/mL injection    Elevated coronary artery calcium score        Relevant Medications    evolocumab (Repatha SureClick) 140 mg/mL injection              Clay Otero MD

## 2024-07-30 ENCOUNTER — APPOINTMENT (OUTPATIENT)
Dept: PRIMARY CARE | Facility: CLINIC | Age: 74
End: 2024-07-30
Payer: MEDICARE

## 2024-07-30 VITALS
TEMPERATURE: 97.9 F | DIASTOLIC BLOOD PRESSURE: 82 MMHG | HEART RATE: 78 BPM | SYSTOLIC BLOOD PRESSURE: 124 MMHG | BODY MASS INDEX: 21.47 KG/M2 | HEIGHT: 70 IN | WEIGHT: 150 LBS | RESPIRATION RATE: 15 BRPM

## 2024-07-30 DIAGNOSIS — T46.6X5A STATIN-INDUCED MYOSITIS: ICD-10-CM

## 2024-07-30 DIAGNOSIS — E78.5 DYSLIPIDEMIA: Primary | ICD-10-CM

## 2024-07-30 DIAGNOSIS — E11.9 TYPE 2 DIABETES MELLITUS WITHOUT COMPLICATION, WITHOUT LONG-TERM CURRENT USE OF INSULIN (MULTI): ICD-10-CM

## 2024-07-30 DIAGNOSIS — M60.9 STATIN-INDUCED MYOSITIS: ICD-10-CM

## 2024-07-30 DIAGNOSIS — R93.1 ELEVATED CORONARY ARTERY CALCIUM SCORE: ICD-10-CM

## 2024-07-30 PROCEDURE — 1126F AMNT PAIN NOTED NONE PRSNT: CPT | Performed by: INTERNAL MEDICINE

## 2024-07-30 PROCEDURE — 3074F SYST BP LT 130 MM HG: CPT | Performed by: INTERNAL MEDICINE

## 2024-07-30 PROCEDURE — 99214 OFFICE O/P EST MOD 30 MIN: CPT | Performed by: INTERNAL MEDICINE

## 2024-07-30 PROCEDURE — 1159F MED LIST DOCD IN RCRD: CPT | Performed by: INTERNAL MEDICINE

## 2024-07-30 PROCEDURE — 1036F TOBACCO NON-USER: CPT | Performed by: INTERNAL MEDICINE

## 2024-07-30 PROCEDURE — 3079F DIAST BP 80-89 MM HG: CPT | Performed by: INTERNAL MEDICINE

## 2024-07-30 RX ORDER — EVOLOCUMAB 140 MG/ML
140 INJECTION, SOLUTION SUBCUTANEOUS
Qty: 2 EACH | Refills: 3 | Status: SHIPPED | OUTPATIENT
Start: 2024-07-30

## 2024-07-30 RX ORDER — EVOLOCUMAB 140 MG/ML
140 INJECTION, SOLUTION SUBCUTANEOUS
Qty: 2 EACH | Refills: 3 | Status: SHIPPED | OUTPATIENT
Start: 2024-07-30 | End: 2024-07-30

## 2024-07-30 ASSESSMENT — ENCOUNTER SYMPTOMS
LOSS OF SENSATION IN FEET: 0
DEPRESSION: 0
OCCASIONAL FEELINGS OF UNSTEADINESS: 0

## 2024-07-30 ASSESSMENT — PAIN SCALES - GENERAL: PAINLEVEL: 0-NO PAIN

## 2024-08-20 ENCOUNTER — TELEPHONE (OUTPATIENT)
Dept: PHARMACY | Facility: HOSPITAL | Age: 74
End: 2024-08-20
Payer: MEDICARE

## 2024-08-20 NOTE — TELEPHONE ENCOUNTER
Population Health: Outreach by Ambulatory Pharmacy Team    Payor: [employer] [place]  Reason: Adherence  Medication: Simvastatin 40mg  Outcome: Left Voicemail    EARNEST HENRY

## 2024-08-20 NOTE — TELEPHONE ENCOUNTER
Patient returned call, had muscle pains with simvastatin and was temporarily taken off of it. Looking to change meds potentially to a different statin, decrease statin dose, or change to another cholesterol med.    Humaira Glover, PharmD

## 2024-08-20 NOTE — TELEPHONE ENCOUNTER
I reviewed the telephone encounter and agree with the student’s findings and plans as written. Case discussed with student.    Humaira Glover, PharmD

## 2024-09-17 DIAGNOSIS — E11.9 TYPE 2 DIABETES MELLITUS WITHOUT COMPLICATION, WITHOUT LONG-TERM CURRENT USE OF INSULIN (MULTI): ICD-10-CM

## 2024-09-17 NOTE — PROGRESS NOTES
Subjective   Bradly Infante is a 74 y.o. male who presents for patient is here for diabetes Muscle and joint pain .  HPI  Bradly is 74-year-old male with known history of benign essential hypertension type 2 diabetes dyslipidemia erectile dysfunction and nocturia patient is here for follow-up voices no major complaint denies chest pain shortness of breath fever chills nausea vomiting constipation diarrhea dysuria urgency frequency, takes medication as prescribed. Patient was working out 3 X per week, plays [employer] ball now down to once a week, muscle and joint pain.         Review of Systems  10 system review pertinent as above  Objective     There were no vitals taken for this visit.         Physical Exam  HEENT: Atraumatic normocephalic the pupils are equal and round and reactive to light the sclerae nonicteric extraocular motion are intact.  Neck: Is supple without JVD no carotid bruits the trachea is midline there are no masses pulses are equal and bilateral with normal upstroke.  Skin: Normal.  Skin good texture.  Moist.  Good turgor.  No lesions, no rashes.  Lymph: No lymphadenopathy appreciated, no masses, no lesions  Lungs: Are clear to auscultation and percussion, good breath sounds bilaterally, no rhonchi, no wheezing, good diaphragmatic excursion.  Heart: Normal rate and normal rhythm S1, S2, no S3, no gallop, murmur or rub.  Abdomen: Soft, nontender, no organomegaly, good bowel sounds.    Extremities: Full range of motion, good pulses bilateral.  No cyanosis, no clubbing or edema.  Neuro: Cranial nerves II-XII are grossly intact there is no sensory or motor deficits.  Able to move all extremities.      Assessment/Plan     Shoulder pain  Rotator cuff Tendinis  Had surgery Dr Dudley 2004 Circa  Pain is worse in am     Statin induced mycostasis  Off Crestor  Will try Zetia     RX for Rapatha  In the settings  Of   Statin induced Myositis  Dyslipidemia  DM2  CACS Score 5.6  Tried rapatha costly will try  Zetia    PSA 06/2023 next Misa 3 2024  Colonoscopy 2021 Feb tubular Adenoma repeat in 5 years Ie Feb 2026     Diabetes A1c 10.5 (12/2023), 7.8 (06/2024)  Continue your current medications  Remain on a Low carbohydrate diet, exercise daily  Check a sugar before breakfast and before dinner    DM2  Weight loss in the setting of diabetes I  Metformin  BID  Jardiance 10 mg daily  A1c 7.4% 06/2024 improved   Stable blood glucose reported by patient  140's     Colonoscopy completed February 2021  Cardiac calcium score compared to January 2021 low    Immunizations    Continue with the low-fat, low-cholesterol diet,  I recommended Mediterranean diet, which include fish, chicken, vegetables and olive oil  Exercise daily for 30 minutes at least 3 times a week  Continue home medications  CACS 5.6 02/02/21     Diabetes  Continue your current medications  Remain on a Low carbohydrate diet, exercise daily  Check a sugar before breakfast and before dinner  On metformin take prn on his own   A1c 10.5% 12/01/2023    Hypertension  No added salt diet, do not and salt to your food  Try to exercise every other day for 30 minutes  Continue current medications    Poison Ivy dermatitis  Medrol dose pack  Triamcinolone cream     Problem List Items Addressed This Visit    None          Clay Otero MD

## 2024-09-18 RX ORDER — METFORMIN HYDROCHLORIDE 500 MG/1
500 TABLET, EXTENDED RELEASE ORAL DAILY
Qty: 100 TABLET | Refills: 2 | Status: SHIPPED | OUTPATIENT
Start: 2024-09-18

## 2024-09-19 ENCOUNTER — APPOINTMENT (OUTPATIENT)
Dept: PRIMARY CARE | Facility: CLINIC | Age: 74
End: 2024-09-19
Payer: MEDICARE

## 2024-09-19 VITALS
BODY MASS INDEX: 22.22 KG/M2 | TEMPERATURE: 97.5 F | SYSTOLIC BLOOD PRESSURE: 124 MMHG | WEIGHT: 150 LBS | HEART RATE: 78 BPM | RESPIRATION RATE: 16 BRPM | HEIGHT: 69 IN | DIASTOLIC BLOOD PRESSURE: 82 MMHG

## 2024-09-19 DIAGNOSIS — T46.6X5A STATIN-INDUCED MYOSITIS: ICD-10-CM

## 2024-09-19 DIAGNOSIS — M75.82 TENDINITIS OF LEFT ROTATOR CUFF: ICD-10-CM

## 2024-09-19 DIAGNOSIS — I10 BENIGN ESSENTIAL HTN: ICD-10-CM

## 2024-09-19 DIAGNOSIS — E78.5 DYSLIPIDEMIA: Primary | ICD-10-CM

## 2024-09-19 DIAGNOSIS — I10 PRIMARY HYPERTENSION: ICD-10-CM

## 2024-09-19 DIAGNOSIS — M60.9 STATIN-INDUCED MYOSITIS: ICD-10-CM

## 2024-09-19 DIAGNOSIS — E11.9 TYPE 2 DIABETES MELLITUS WITHOUT COMPLICATION, WITHOUT LONG-TERM CURRENT USE OF INSULIN (MULTI): ICD-10-CM

## 2024-09-19 PROCEDURE — 3079F DIAST BP 80-89 MM HG: CPT | Performed by: INTERNAL MEDICINE

## 2024-09-19 PROCEDURE — 90662 IIV NO PRSV INCREASED AG IM: CPT | Performed by: INTERNAL MEDICINE

## 2024-09-19 PROCEDURE — G2211 COMPLEX E/M VISIT ADD ON: HCPCS | Performed by: INTERNAL MEDICINE

## 2024-09-19 PROCEDURE — 99214 OFFICE O/P EST MOD 30 MIN: CPT | Performed by: INTERNAL MEDICINE

## 2024-09-19 PROCEDURE — 3074F SYST BP LT 130 MM HG: CPT | Performed by: INTERNAL MEDICINE

## 2024-09-19 PROCEDURE — 1036F TOBACCO NON-USER: CPT | Performed by: INTERNAL MEDICINE

## 2024-09-19 PROCEDURE — G0008 ADMIN INFLUENZA VIRUS VAC: HCPCS | Performed by: INTERNAL MEDICINE

## 2024-09-19 PROCEDURE — 1126F AMNT PAIN NOTED NONE PRSNT: CPT | Performed by: INTERNAL MEDICINE

## 2024-09-19 RX ORDER — EZETIMIBE 10 MG/1
10 TABLET ORAL DAILY
Qty: 30 TABLET | Refills: 11 | Status: SHIPPED | OUTPATIENT
Start: 2024-09-19 | End: 2025-09-19

## 2024-09-19 ASSESSMENT — PAIN SCALES - GENERAL: PAINLEVEL: 0-NO PAIN

## 2024-09-19 NOTE — PROGRESS NOTES
Subjective   Patient ID: Bradly Infante is a 74 y.o. male    Chief Complaint: No chief complaint on file.       Last Surgery: No surgery found  Date of Last Surgery: No surgery found    HPI  Bradly Infante is a 74 y.o. right hand dominant male presenting for left shoulder pain    His left shoulder pain has worsened over the last 6 weeks. Prior to this it was tolerable. He did fall off of a step stool. He is struggling with sleep at night. He enjoys yard work and is avoiding ladders. His shoulder has not improved since his fall. Bench pressing and butterflies are painful.     He is a diabetic and is on medication for this.     Objective   Patient is a well-developed, well nourished male in no acute distress. Awake, alert and oriented x3, with appropriate mood. Breathes with normal chest rises. Eye exam reveals round pupils with clear sclera. Gait is steady.    Patient had full range of motion of bilateral elbows. 5 out of 5 bilateral wrist flexion and extension. Thumb extension positive bilaterally. Sensation was intact to light touch to median, ulnar, radial, and axillary nerves bilaterally. Positive radial pulse bilaterally. Cervical lymphadenopathy examined, and this was negative.    Examination of the right shoulder today reveals the skin to be intact. There is no sign any atrophy lesions or abrasions. There is no pain to palpation of the bony prominences. Range of motion of the right shoulder revealed 0-170° of forward elevation. 0-60° of external rotation. Internal rotation was to T12. Provocative maneuvers today were negative.    Examination of the left shoulder reveals the skin to be intact. No sign any atrophy lesions or abrasions.  Active range of motion is 130° of forward elevation, passively correctable.  50° of external rotation. Internal rotation was to lower lumbar region. Abduction to 0-160°. Pain with Stanton's testing. Mildly positive Hawkin's     Imaging:  X-rays of the of the left shoulder taken today  ordered and personally interpreted by me show no signs of any fracture, dislocation, arthritis or proximal humerus migration. Some changes to the greater tuberosity. No os acromiale     Assessment/Plan   No diagnosis found.  Patient with an acute injury to his left rotator cuff after a fall from a ladder    At this time, we had a very long discussion with the patient regarding the diagnosis. Rotator cuff disease is a spectrum of disorders ranging from rotator cuff tendinitis to full-thickness rotator cuff tears. We know that some patients over the age of 50 will have symptomatic rotator cuff tears just due to overuse and general wear and tear. In general, most patients who come in with complaints such as these will get better with therapy and injections alone. The therapy should be performed 2-3 times a day and should take about 10-15 minutes to perform each time.       We will order an MRI to evaluate the rotator cuff. We know from literature that early detection of acute rotator cuff tears leads to improved outcomes in healing and functionality. They have weakness that is increased after the injury and have not regained their range of motion. As a result, we will order a STAT MRI and will discuss the results over the phone.     Hopefully he does not have a tear but I am concerned given his weakness and pain after the injury. We have ordered an MRI and he is going to do exercises in the meantime.     No orders of the defined types were placed in this encounter.        Follow up once MRI is completed.     Scribe Attestation  By signing my name below, IZita Scribe   attest that this documentation has been prepared under the direction and in the presence of Chip Blunt MD.

## 2024-09-20 ENCOUNTER — OFFICE VISIT (OUTPATIENT)
Dept: ORTHOPEDIC SURGERY | Facility: CLINIC | Age: 74
End: 2024-09-20
Payer: MEDICARE

## 2024-09-20 ENCOUNTER — HOSPITAL ENCOUNTER (OUTPATIENT)
Dept: RADIOLOGY | Facility: CLINIC | Age: 74
Discharge: HOME | End: 2024-09-20
Payer: MEDICARE

## 2024-09-20 ENCOUNTER — APPOINTMENT (OUTPATIENT)
Dept: RADIOLOGY | Facility: CLINIC | Age: 74
End: 2024-09-20
Payer: MEDICARE

## 2024-09-20 DIAGNOSIS — M75.82 TENDINITIS OF LEFT ROTATOR CUFF: ICD-10-CM

## 2024-09-20 DIAGNOSIS — M75.102 TEAR OF LEFT ROTATOR CUFF, UNSPECIFIED TEAR EXTENT, UNSPECIFIED WHETHER TRAUMATIC: Primary | ICD-10-CM

## 2024-09-20 PROCEDURE — 99204 OFFICE O/P NEW MOD 45 MIN: CPT | Performed by: ORTHOPAEDIC SURGERY

## 2024-09-20 PROCEDURE — 73030 X-RAY EXAM OF SHOULDER: CPT | Mod: LT

## 2024-09-24 ENCOUNTER — HOSPITAL ENCOUNTER (OUTPATIENT)
Dept: RADIOLOGY | Facility: CLINIC | Age: 74
Discharge: HOME | End: 2024-09-24
Payer: MEDICARE

## 2024-09-24 DIAGNOSIS — M75.102 TEAR OF LEFT ROTATOR CUFF, UNSPECIFIED TEAR EXTENT, UNSPECIFIED WHETHER TRAUMATIC: ICD-10-CM

## 2024-09-24 PROCEDURE — 73221 MRI JOINT UPR EXTREM W/O DYE: CPT | Mod: LT

## 2024-09-24 PROCEDURE — 73221 MRI JOINT UPR EXTREM W/O DYE: CPT | Mod: LEFT SIDE | Performed by: RADIOLOGY

## 2024-10-04 ENCOUNTER — DOCUMENTATION (OUTPATIENT)
Dept: ORTHOPEDIC SURGERY | Facility: HOSPITAL | Age: 74
End: 2024-10-04
Payer: MEDICARE

## 2024-10-04 NOTE — PROGRESS NOTES
Reviewed mri, no full thickness rc tear, recommend injection and pt to get shoulder back to normal. Surgery last resort

## 2024-10-16 ENCOUNTER — APPOINTMENT (OUTPATIENT)
Dept: PRIMARY CARE | Facility: CLINIC | Age: 74
End: 2024-10-16
Payer: MEDICARE

## 2024-10-16 NOTE — PROGRESS NOTES
Subjective   Bradly Infante is a 74 y.o. male who presents for patient is here for follow up.    HPI  Bradly is 74-year-old male with known history of benign essential hypertension type 2 diabetes dyslipidemia erectile dysfunction and nocturia patient is here for follow-up voices no major complaint denies chest pain shortness of breath fever chills nausea vomiting constipation diarrhea dysuria urgency frequency, takes medication as prescribed.   Patient was having severe muscle pain, we stopped statins now much improved.       Review of Systems  10 system review pertinent as above  Objective     There were no vitals taken for this visit.         Physical Exam  HEENT: Atraumatic normocephalic the pupils are equal and round and reactive to light the sclerae nonicteric extraocular motion are intact.  Neck: Is supple without JVD no carotid bruits the trachea is midline there are no masses pulses are equal and bilateral with normal upstroke.  Skin: Normal.  Skin good texture.  Moist.  Good turgor.  No lesions, no rashes.  Lymph: No lymphadenopathy appreciated, no masses, no lesions  Lungs: Are clear to auscultation and percussion, good breath sounds bilaterally, no rhonchi, no wheezing, good diaphragmatic excursion.  Heart: Normal rate and normal rhythm S1, S2, no S3, no gallop, murmur or rub.  Abdomen: Soft, nontender, no organomegaly, good bowel sounds.    Extremities: Full range of motion, good pulses bilateral.  No cyanosis, no clubbing or edema.  Neuro: Cranial nerves II-XII are grossly intact there is no sensory or motor deficits.  Able to move all extremities.      Assessment/Plan     In for physical exam    Fasting blood work    CBC BMP lipids AST ALT vitamin D 25-hydroxy    Immunization  High-dose flu vaccine 09/2024  Pneumonia vaccine Next 2025  Shingles vaccine Shingles patient states had but no record  ( pharmacy [facility])    Prevention  Colonoscopy  PSA    Shoulder pain Lt  Rotator cuff  Gabriel  Had surgery Dr Dudley 2004 Circa  Pain is worse in am   Will follow with Ortho shoulder Dr Conde    Statin induced mycostasis  Off Crestor  We tried PCS K 9 not covered too expensive  Now on Zetia       CAD Risk Stratification  Dyslipidemia  DM2  CACS Score 5.6  On  Zetia    PSA 06/2023 next Misa 3 2024  Colonoscopy 2021 Feb tubular Adenoma repeat in 5 years Ie Feb 2026     Diabetes A1c 10.5 (12/2023), 7.8 (06/2024)  Continue your current medications  Remain on a Low carbohydrate diet, exercise daily  Check a sugar before breakfast and before dinner    DM2  Weight loss in the setting of diabetes I  Metformin  BID  Jardiance 10 mg daily  A1c 7.4% 06/2024 improved   Stable blood glucose reported by patient  140's     Colonoscopy completed February 2021  Cardiac calcium score compared to January 2021 low    Immunizations    Continue with the low-fat, low-cholesterol diet,  I recommended Mediterranean diet, which include fish, chicken, vegetables and olive oil  Exercise daily for 30 minutes at least 3 times a week  Continue home medications  CACS 5.6 02/02/21     Diabetes  Continue your current medications  Remain on a Low carbohydrate diet, exercise daily  Check a sugar before breakfast and before dinner  On metformin take prn on his own   A1c 10.5% 12/01/2023    Hypertension  No added salt diet, do not and salt to your food  Try to exercise every other day for 30 minutes  Continue current medications    Poison Ivy dermatitis  Medrol dose pack  Triamcinolone cream     Problem List Items Addressed This Visit    None          Clay Otero MD

## 2024-10-22 ENCOUNTER — APPOINTMENT (OUTPATIENT)
Dept: PRIMARY CARE | Facility: CLINIC | Age: 74
End: 2024-10-22
Payer: MEDICARE

## 2024-10-22 VITALS
TEMPERATURE: 97.9 F | RESPIRATION RATE: 14 BRPM | BODY MASS INDEX: 23.99 KG/M2 | DIASTOLIC BLOOD PRESSURE: 80 MMHG | HEIGHT: 69 IN | WEIGHT: 162 LBS | SYSTOLIC BLOOD PRESSURE: 122 MMHG

## 2024-10-22 DIAGNOSIS — T46.6X5A STATIN-INDUCED MYOSITIS: ICD-10-CM

## 2024-10-22 DIAGNOSIS — M60.9 STATIN-INDUCED MYOSITIS: ICD-10-CM

## 2024-10-22 DIAGNOSIS — I10 BENIGN ESSENTIAL HTN: ICD-10-CM

## 2024-10-22 DIAGNOSIS — Z00.00 ROUTINE GENERAL MEDICAL EXAMINATION AT HEALTH CARE FACILITY: Primary | ICD-10-CM

## 2024-10-22 DIAGNOSIS — E78.5 DYSLIPIDEMIA: ICD-10-CM

## 2024-10-22 DIAGNOSIS — E11.9 TYPE 2 DIABETES MELLITUS WITHOUT COMPLICATION, WITHOUT LONG-TERM CURRENT USE OF INSULIN (MULTI): ICD-10-CM

## 2024-10-22 DIAGNOSIS — R93.1 ELEVATED CORONARY ARTERY CALCIUM SCORE: ICD-10-CM

## 2024-10-22 LAB
ALT SERPL W P-5'-P-CCNC: 20 U/L (ref 14–59)
ANION GAP SERPL CALC-SCNC: 16 MMOL/L (ref 10–20)
AST SERPL W P-5'-P-CCNC: 11 U/L (ref 15–37)
BUN SERPL-MCNC: 20 MG/DL (ref 7–18)
CALCIUM SERPL-MCNC: 9.3 MG/DL (ref 8.5–10.1)
CHLORIDE SERPL-SCNC: 101 MMOL/L (ref 98–107)
CHOLEST SERPL-MCNC: 173 MG/DL (ref 0–199)
CHOLESTEROL/HDL RATIO: 3 (ref 4.2–7)
CO2 SERPL-SCNC: 25 MMOL/L (ref 21–32)
CREAT SERPL-MCNC: 0.94 MG/DL (ref 0.6–1.1)
EGFRCR SERPLBLD CKD-EPI 2021: 84 ML/MIN/1.73M*2
GLUCOSE SERPL-MCNC: 133 MG/DL (ref 74–100)
HBA1C MFR BLD: 6.6 %
HDLC SERPL-MCNC: 58 MG/DL (ref 40–59)
IS PATIENT FASTING: YES
LDLC SERPL DIRECT ASSAY-MCNC: 95 MG/DL (ref 0–100)
POTASSIUM SERPL-SCNC: 4.6 MMOL/L (ref 3.5–5.1)
SODIUM SERPL-SCNC: 137 MMOL/L (ref 136–145)
TRIGL SERPL-MCNC: 135 MG/DL

## 2024-10-22 ASSESSMENT — ANXIETY QUESTIONNAIRES
IF YOU CHECKED OFF ANY PROBLEMS ON THIS QUESTIONNAIRE, HOW DIFFICULT HAVE THESE PROBLEMS MADE IT FOR YOU TO DO YOUR WORK, TAKE CARE OF THINGS AT HOME, OR GET ALONG WITH OTHER PEOPLE: NOT DIFFICULT AT ALL
6. BECOMING EASILY ANNOYED OR IRRITABLE: NOT AT ALL
4. TROUBLE RELAXING: NOT AT ALL
5. BEING SO RESTLESS THAT IT IS HARD TO SIT STILL: NOT AT ALL
2. NOT BEING ABLE TO STOP OR CONTROL WORRYING: NOT AT ALL
GAD7 TOTAL SCORE: 0
1. FEELING NERVOUS, ANXIOUS, OR ON EDGE: NOT AT ALL
3. WORRYING TOO MUCH ABOUT DIFFERENT THINGS: NOT AT ALL
7. FEELING AFRAID AS IF SOMETHING AWFUL MIGHT HAPPEN: NOT AT ALL

## 2024-10-22 ASSESSMENT — ACTIVITIES OF DAILY LIVING (ADL)
WALKS IN HOME: INDEPENDENT
ADEQUATE_TO_COMPLETE_ADL: YES
STIL DRIVING: YES
MANAGING FINANCES: INDEPENDENT
TAKING MEDICATION: INDEPENDENT
DOING HOUSEWORK: INDEPENDENT
FEEDING YOURSELF: INDEPENDENT
PREPARING MEALS: INDEPENDENT
PILL BOX USED: NO
NEEDS ASSISTANCE WITH FOOD: INDEPENDENT
BATHING: INDEPENDENT
USING TRANSPORTATION: INDEPENDENT
TOILETING: INDEPENDENT
EATING: INDEPENDENT
HEARING - LEFT EAR: FUNCTIONAL
PATIENT'S MEMORY ADEQUATE TO SAFELY COMPLETE DAILY ACTIVITIES?: YES
JUDGMENT_ADEQUATE_SAFELY_COMPLETE_DAILY_ACTIVITIES: YES
GROCERY SHOPPING: INDEPENDENT
HEARING - RIGHT EAR: FUNCTIONAL
USING TELEPHONE: INDEPENDENT
DRESSING YOURSELF: INDEPENDENT
GROOMING: INDEPENDENT

## 2024-10-22 ASSESSMENT — GERIATRIC MINI NUTRITIONAL ASSESSMENT (MNA)
B WEIGHT LOSS DURING THE LAST 3 MONTHS: NO WEIGHT LOSS
D HAS SUFFERED PSYCHOLOGICAL STRESS OR ACUTE DISEASE IN THE PAST 3 MONTHS?: NO
A HAS FOOD INTAKE DECLINED OVER THE PAST 3 MONTHS DUE TO LOSS OF APPETITE, DIGESTIVE PROBLEMS, CHEWING OR SWALLOWING DIFFICULTIES?: NO DECREASE IN FOOD INTAKE
E NEUROPSYCHOLOGICAL PROBLEMS: NO PSYCHOLOGICAL PROBLEMS
C GENERAL MOBILITY: GOES OUT

## 2024-10-22 ASSESSMENT — PATIENT HEALTH QUESTIONNAIRE - PHQ9
1. LITTLE INTEREST OR PLEASURE IN DOING THINGS: NOT AT ALL
SUM OF ALL RESPONSES TO PHQ9 QUESTIONS 1 AND 2: 0
2. FEELING DOWN, DEPRESSED OR HOPELESS: NOT AT ALL

## 2024-10-22 ASSESSMENT — PAIN SCALES - GENERAL: PAINLEVEL_OUTOF10: 0-NO PAIN

## 2024-10-22 ASSESSMENT — ENCOUNTER SYMPTOMS
LOSS OF SENSATION IN FEET: 0
DEPRESSION: 0
OCCASIONAL FEELINGS OF UNSTEADINESS: 0

## 2024-10-22 NOTE — PROGRESS NOTES
"Subjective   Reason for Visit: Bradly Infante is an 74 y.o. male here for a Medicare Wellness visit.  Here for Medicare wellness    HPI  Bradly is 74-year-old male here for Medicare wellness is self-sufficient lives at home with his wife voices no complaints reports no falling takes medication prescribed patient is independent.  Patient Care Team:  Clay Otero MD as PCP - General (Internal Medicine)  Clay Otero MD as PCP - [employer] PCP     Review of Systems  10 system are pertinent as above  Objective   Vitals:  /80   Temp 36.6 °C (97.9 °F)   Resp 14   Ht 1.753 m (5' 9\")   Wt 73.5 kg (162 lb)   BMI 23.92 kg/m²       Physical Exam  HEENT: Atraumatic normocephalic the pupils are equal and round and reactive to light the sclerae nonicteric extraocular motion are intact.  Neck: Is supple without JVD no carotid bruits the trachea is midline there are no masses pulses are equal and bilateral with normal upstroke.  Skin: Normal.  Skin good texture.  Moist.  Good turgor.  No lesions, no rashes.  Lymph: No lymphadenopathy appreciated, no masses, no lesions  Lungs: Are clear to auscultation and percussion, good breath sounds bilaterally, no rhonchi, no wheezing, good diaphragmatic excursion.  Heart: Normal rate and normal rhythm S1, S2, no S3, no gallop, murmur or rub.  Abdomen: Soft, nontender, no organomegaly, good bowel sounds.    Extremities: Full range of motion, good pulses bilateral.  No cyanosis, no clubbing or edema.  Neuro: Cranial nerves II-XII are grossly intact there is no sensory or motor deficits.  Able to move all extremities.  Assessment & Plan  Benign essential HTN    Orders:    Basic Metabolic Panel    Dyslipidemia    Orders:    Lipid Panel    AST    ALT    Elevated coronary artery calcium score         Type 2 diabetes mellitus without complication, without long-term current use of insulin (Multi)    Orders:    Basic Metabolic Panel    Hemoglobin A1C    Statin-induced " myositis         Routine general medical examination at health care facility    Orders:    1 Year Follow Up In Primary Care - Wellness Exam; Future    Medicare wellness

## 2024-11-01 ENCOUNTER — APPOINTMENT (OUTPATIENT)
Dept: ORTHOPEDIC SURGERY | Facility: HOSPITAL | Age: 74
End: 2024-11-01
Payer: MEDICARE

## 2024-11-11 NOTE — PROGRESS NOTES
Subjective   Patient ID: Bradly Infante is a 74 y.o. male    Chief Complaint: No chief complaint on file.       Last Surgery: No surgery found  Date of Last Surgery: No surgery found    HPI  Bradly Infante is a 74 y.o. right hand dominant male presenting for left shoulder pain    His left shoulder pain has worsened over the last 6 weeks. Prior to this it was tolerable. He did fall off of a step stool. He is struggling with sleep at night. He enjoys yard work and is avoiding ladders. His shoulder has not improved since his fall. Bench pressing and butterflies are painful.     He is a diabetic and is on medication for this.     11/11/24  Bradly Infante is a 74 y.o. male returning to the clinic for a follow up visit regarding his left shoulder.    Objective   Patient is a well-developed, well nourished male in no acute distress. Awake, alert and oriented x3, with appropriate mood. Breathes with normal chest rises. Eye exam reveals round pupils with clear sclera. Gait is steady.    Patient had full range of motion of bilateral elbows. 5 out of 5 bilateral wrist flexion and extension. Thumb extension positive bilaterally. Sensation was intact to light touch to median, ulnar, radial, and axillary nerves bilaterally. Positive radial pulse bilaterally. Cervical lymphadenopathy examined, and this was negative.    Examination of the right shoulder today reveals the skin to be intact. There is no sign any atrophy lesions or abrasions. There is no pain to palpation of the bony prominences. Range of motion of the right shoulder revealed 0-170° of forward elevation. 0-60° of external rotation. Internal rotation was to T12. +Weaver    Examination of the left shoulder reveals the skin to be intact. No sign any atrophy lesions or abrasions.  Active range of motion is 120° of forward elevation, passively correctable.  50° of external rotation. Internal rotation was to L-3. Abduction to 0-160°. 4/5 Stanton's testing. +Hawkin's      Imaging:  MRI of the left shoulder taken 09/2024 personally ordered and interpreted by me show partial age related tearing. Some increased signal that is consistent with his fall. Small cyst in the bone.    L Inj/Asp: L subacromial bursa on 11/12/2024 10:51 AM  Indications: pain  Details: 20 G needle, anterior approach  Medications: 40 mg triamcinolone acetonide 40 mg/mL; 4 mL lidocaine 10 mg/mL (1 %)  Outcome: tolerated well, no immediate complications  Procedure, treatment alternatives, risks and benefits explained, specific risks discussed. Consent was given by the patient. Immediately prior to procedure a time out was called to verify the correct patient, procedure, equipment, support staff and site/side marked as required. Patient was prepped and draped in the usual sterile fashion.             Assessment/Plan   No diagnosis found.  Patient with left shoulder pain related to his rotator cuff as a direct result of his fall.   No full thickness tearing on MRI. There is nothing that requires him to have urgent surgery. We are going to try therapy and injections at this time. If he finds no relief with conservative management we will consider surgery further.     At this time, we had a very long discussion with the patient regarding the diagnosis. Rotator cuff disease is a spectrum of disorders ranging from rotator cuff tendinitis to full-thickness rotator cuff tears. We know that some patients over the age of 50 will have symptomatic rotator cuff tears just due to overuse and general wear and tear. In general, most patients who come in with complaints such as these will get better with therapy and injections alone. The therapy should be performed 2-3 times a day and should take about 10-15 minutes to perform each time.       He tolerated the injection well today in the LEFT shoulder. We will see how they do after the injection. They are aware that there can be a spike in glucose levels following an  injection.    If they do not get sustained relief from the injection, consideration for a repeat injection versus advanced imaging of an MRI will be warranted as then they would have failed over 6 weeks of conservative management of injections, therapy, rest and over-the-counter pain medications.      No orders of the defined types were placed in this encounter.        Follow up in 3 months    Scribe Attestation  By signing my name below, IZita Scribe   attest that this documentation has been prepared under the direction and in the presence of Chip Blunt MD.

## 2024-11-12 ENCOUNTER — OFFICE VISIT (OUTPATIENT)
Dept: ORTHOPEDIC SURGERY | Facility: HOSPITAL | Age: 74
End: 2024-11-12
Payer: MEDICARE

## 2024-11-12 DIAGNOSIS — M75.82 TENDINITIS OF LEFT ROTATOR CUFF: Primary | ICD-10-CM

## 2024-11-12 PROCEDURE — 2500000004 HC RX 250 GENERAL PHARMACY W/ HCPCS (ALT 636 FOR OP/ED): Performed by: ORTHOPAEDIC SURGERY

## 2024-11-12 PROCEDURE — 99214 OFFICE O/P EST MOD 30 MIN: CPT | Performed by: ORTHOPAEDIC SURGERY

## 2024-11-12 PROCEDURE — 20610 DRAIN/INJ JOINT/BURSA W/O US: CPT | Mod: LT | Performed by: ORTHOPAEDIC SURGERY

## 2024-11-12 RX ADMIN — LIDOCAINE HYDROCHLORIDE 4 ML: 10 INJECTION, SOLUTION INFILTRATION; PERINEURAL at 10:51

## 2024-11-12 RX ADMIN — TRIAMCINOLONE ACETONIDE 40 MG: 200 INJECTION, SUSPENSION INTRA-ARTICULAR; INTRAMUSCULAR at 10:51

## 2024-11-17 RX ORDER — LIDOCAINE HYDROCHLORIDE 10 MG/ML
4 INJECTION, SOLUTION INFILTRATION; PERINEURAL
Status: COMPLETED | OUTPATIENT
Start: 2024-11-12 | End: 2024-11-12

## 2024-11-17 RX ORDER — TRIAMCINOLONE ACETONIDE 40 MG/ML
40 INJECTION, SUSPENSION INTRA-ARTICULAR; INTRAMUSCULAR
Status: COMPLETED | OUTPATIENT
Start: 2024-11-12 | End: 2024-11-12

## 2024-11-26 DIAGNOSIS — I10 BENIGN ESSENTIAL HTN: ICD-10-CM

## 2024-11-29 RX ORDER — BISOPROLOL FUMARATE 10 MG/1
10 TABLET, FILM COATED ORAL DAILY
Qty: 100 TABLET | Refills: 2 | Status: SHIPPED | OUTPATIENT
Start: 2024-11-29

## 2024-12-10 DIAGNOSIS — E11.9 TYPE 2 DIABETES MELLITUS WITHOUT COMPLICATION, WITHOUT LONG-TERM CURRENT USE OF INSULIN (MULTI): Primary | ICD-10-CM

## 2024-12-10 RX ORDER — INSULIN PUMP SYRINGE, 3 ML
1 EACH MISCELLANEOUS AS NEEDED
COMMUNITY
End: 2024-12-10 | Stop reason: SDUPTHER

## 2024-12-11 RX ORDER — INSULIN PUMP SYRINGE, 3 ML
EACH MISCELLANEOUS
Qty: 1 EACH | Refills: 0 | Status: SHIPPED | OUTPATIENT
Start: 2024-12-11

## 2024-12-11 RX ORDER — BLOOD-GLUCOSE CONTROL, NORMAL
EACH MISCELLANEOUS
Qty: 100 EACH | Refills: 1 | Status: SHIPPED | OUTPATIENT
Start: 2024-12-11

## 2024-12-17 DIAGNOSIS — E11.9 TYPE 2 DIABETES MELLITUS WITHOUT COMPLICATION, WITHOUT LONG-TERM CURRENT USE OF INSULIN (MULTI): ICD-10-CM

## 2024-12-17 RX ORDER — EMPAGLIFLOZIN 10 MG/1
10 TABLET, FILM COATED ORAL DAILY
Qty: 60 TABLET | Refills: 5 | Status: SHIPPED | OUTPATIENT
Start: 2024-12-17

## 2025-01-06 ENCOUNTER — TELEPHONE (OUTPATIENT)
Dept: PRIMARY CARE | Facility: CLINIC | Age: 75
End: 2025-01-06
Payer: MEDICARE

## 2025-01-07 DIAGNOSIS — I10 BENIGN ESSENTIAL HTN: ICD-10-CM

## 2025-01-07 DIAGNOSIS — E11.9 TYPE 2 DIABETES MELLITUS WITHOUT COMPLICATION, WITHOUT LONG-TERM CURRENT USE OF INSULIN (MULTI): ICD-10-CM

## 2025-01-07 DIAGNOSIS — E78.5 DYSLIPIDEMIA: ICD-10-CM

## 2025-01-07 RX ORDER — BISOPROLOL FUMARATE 10 MG/1
10 TABLET, FILM COATED ORAL DAILY
Qty: 90 TABLET | Refills: 3 | Status: SHIPPED | OUTPATIENT
Start: 2025-01-07

## 2025-01-07 RX ORDER — EZETIMIBE 10 MG/1
10 TABLET ORAL DAILY
Qty: 90 TABLET | Refills: 3 | Status: SHIPPED | OUTPATIENT
Start: 2025-01-07

## 2025-01-07 NOTE — TELEPHONE ENCOUNTER
Patient is requesting a refill of Jardiance 10 mg, bisoprolol 10 mg, ezetimibe 10 mg, to [facility].

## 2025-01-20 ENCOUNTER — HOSPITAL ENCOUNTER (OUTPATIENT)
Dept: RADIOLOGY | Facility: CLINIC | Age: 75
Discharge: HOME | End: 2025-01-20
Payer: MEDICARE

## 2025-01-20 ENCOUNTER — APPOINTMENT (OUTPATIENT)
Dept: PRIMARY CARE | Facility: CLINIC | Age: 75
End: 2025-01-20
Payer: MEDICARE

## 2025-01-20 VITALS
DIASTOLIC BLOOD PRESSURE: 80 MMHG | SYSTOLIC BLOOD PRESSURE: 120 MMHG | RESPIRATION RATE: 14 BRPM | BODY MASS INDEX: 21.77 KG/M2 | WEIGHT: 147 LBS | HEART RATE: 76 BPM | TEMPERATURE: 97.9 F | HEIGHT: 69 IN

## 2025-01-20 DIAGNOSIS — M54.50 LUMBAR PAIN: ICD-10-CM

## 2025-01-20 DIAGNOSIS — I10 BENIGN ESSENTIAL HTN: ICD-10-CM

## 2025-01-20 DIAGNOSIS — E11.9 TYPE 2 DIABETES MELLITUS WITHOUT COMPLICATION, WITHOUT LONG-TERM CURRENT USE OF INSULIN (MULTI): ICD-10-CM

## 2025-01-20 DIAGNOSIS — M54.89 LEFT PARASPINAL BACK PAIN: ICD-10-CM

## 2025-01-20 DIAGNOSIS — M54.50 LUMBAR PAIN: Primary | ICD-10-CM

## 2025-01-20 DIAGNOSIS — I10 PRIMARY HYPERTENSION: ICD-10-CM

## 2025-01-20 LAB
APPEARANCE UR: CLEAR
BILIRUB UR QL STRIP: NEGATIVE
COLOR UR: YELLOW
GLUCOSE UR STRIP-MCNC: ABNORMAL MG/DL
HGB UR QL STRIP: NEGATIVE
KETONES UR STRIP-MCNC: ABNORMAL MG/DL
LEUKOCYTE ESTERASE UR QL STRIP: NEGATIVE
NITRITE UR QL STRIP: NEGATIVE
PH UR STRIP: 5 [PH]
PROT UR STRIP-MCNC: NEGATIVE MG/DL
SP GR UR STRIP.AUTO: 1.01
UROBILINOGEN UR STRIP-ACNC: 0.2 E.U./DL

## 2025-01-20 PROCEDURE — 1160F RVW MEDS BY RX/DR IN RCRD: CPT | Performed by: INTERNAL MEDICINE

## 2025-01-20 PROCEDURE — 1158F ADVNC CARE PLAN TLK DOCD: CPT | Performed by: INTERNAL MEDICINE

## 2025-01-20 PROCEDURE — 1159F MED LIST DOCD IN RCRD: CPT | Performed by: INTERNAL MEDICINE

## 2025-01-20 PROCEDURE — 1125F AMNT PAIN NOTED PAIN PRSNT: CPT | Performed by: INTERNAL MEDICINE

## 2025-01-20 PROCEDURE — 1123F ACP DISCUSS/DSCN MKR DOCD: CPT | Performed by: INTERNAL MEDICINE

## 2025-01-20 PROCEDURE — 99214 OFFICE O/P EST MOD 30 MIN: CPT | Performed by: INTERNAL MEDICINE

## 2025-01-20 PROCEDURE — 3079F DIAST BP 80-89 MM HG: CPT | Performed by: INTERNAL MEDICINE

## 2025-01-20 PROCEDURE — 72110 X-RAY EXAM L-2 SPINE 4/>VWS: CPT | Performed by: RADIOLOGY

## 2025-01-20 PROCEDURE — 72110 X-RAY EXAM L-2 SPINE 4/>VWS: CPT

## 2025-01-20 PROCEDURE — 81003 URINALYSIS AUTO W/O SCOPE: CPT | Performed by: INTERNAL MEDICINE

## 2025-01-20 PROCEDURE — 3074F SYST BP LT 130 MM HG: CPT | Performed by: INTERNAL MEDICINE

## 2025-01-20 PROCEDURE — G2211 COMPLEX E/M VISIT ADD ON: HCPCS | Performed by: INTERNAL MEDICINE

## 2025-01-20 RX ORDER — METHYLPREDNISOLONE 4 MG/1
TABLET ORAL
Qty: 21 TABLET | Refills: 0 | Status: SHIPPED | OUTPATIENT
Start: 2025-01-20 | End: 2025-01-26

## 2025-01-20 RX ORDER — TIZANIDINE 2 MG/1
2 TABLET ORAL NIGHTLY
Qty: 10 TABLET | Refills: 0 | Status: SHIPPED | OUTPATIENT
Start: 2025-01-20 | End: 2025-01-30

## 2025-01-20 ASSESSMENT — ENCOUNTER SYMPTOMS
OCCASIONAL FEELINGS OF UNSTEADINESS: 0
LOSS OF SENSATION IN FEET: 0
DEPRESSION: 0

## 2025-01-20 ASSESSMENT — PAIN SCALES - GENERAL: PAINLEVEL_OUTOF10: 4

## 2025-01-20 NOTE — PROGRESS NOTES
Subjective   Eric Infante is a 74 y.o. male who presents for patient left back pain.    HPI  Bradly is 74-year-old male with known history of benign essential hypertension type 2 diabetes dyslipidemia erectile dysfunction and nocturia patient is here for follow-up voices no major complaint denies chest pain shortness of breath fever chills nausea vomiting constipation diarrhea dysuria urgency frequency, takes medication as prescribed.   Back pain started one week ago started after shoveling snow. Left sided muscle spasm.      Review of Systems  10 system review pertinent as above  Objective     Visit Vitals  /80   Pulse 76   Temp 36.6 °C (97.9 °F)   Resp 14         Physical Exam  HEENT: Atraumatic normocephalic the pupils are equal and round and reactive to light the sclerae nonicteric extraocular motion are intact.  Neck: Is supple without JVD no carotid bruits the trachea is midline there are no masses pulses are equal and bilateral with normal upstroke.  Skin: Normal.  Skin good texture.  Moist.  Good turgor.  No lesions, no rashes.  Lymph: No lymphadenopathy appreciated, no masses, no lesions  Lungs: Are clear to auscultation and percussion, good breath sounds bilaterally, no rhonchi, no wheezing, good diaphragmatic excursion.  Heart: Normal rate and normal rhythm S1, S2, no S3, no gallop, murmur or rub.  Abdomen: Soft, nontender, no organomegaly, good bowel sounds.    Extremities: Full range of motion, good pulses bilateral.  No cyanosis, no clubbing or edema.  Neuro: Cranial nerves II-XII are grossly intact there is no sensory or motor deficits.  Able to move all extremities.      Assessment/Plan     Back pain  I suspect strain  Easily reproducible lower back pain Left Lumbar Parasoinal Muscled  POC UA  Xray Lumbar spine  Medrol dose pack  ( sugar will rise while on medrol ) pat aware  No changes to medication needed      Reviewed recent BW 10/2024 essentially normal    Immunization  High-dose flu  vaccine 09/2024  Pneumonia vaccine Next 2025  Shingles vaccine Shingles patient states had but no record  [facility]    Prevention  Colonoscopy  PSA    Shoulder pain Lt  Rotator cuff Tendinis  Had surgery Dr Dudley 2004 Circa  Pain is worse in am   Will follow with Ortho shoulder Dr Conde    Statin induced mycostasis  Off Crestor  We tried PCS K 9 not covered too expensive  Now on Zetia       CAD Risk Stratification  Dyslipidemia  DM2  CACS Score 5.6  On  Zetia    PSA 06/2023 next Misa 3 2024  Colonoscopy 2021 Feb tubular Adenoma repeat in 5 years Ie Feb 2026     Diabetes A1c 10.5 (12/2023), 7.8 (06/2024)  And 6.6%  (10/22/2024)  Continue your current medications  Remain on a Low carbohydrate diet, exercise daily  Check a sugar before breakfast and before dinner    DM2  Weight loss in the setting of diabetes I  Metformin  BID  Jardiance 10 mg daily  A1c 7.4% 06/2024 improved   Stable blood glucose reported by patient  140's     Colonoscopy completed February 2021  Cardiac calcium score compared to January 2021 low    Immunizations    Continue with the low-fat, low-cholesterol diet,  I recommended Mediterranean diet, which include fish, chicken, vegetables and olive oil  Exercise daily for 30 minutes at least 3 times a week  Continue home medications  CACS 5.6 02/02/21     Diabetes  Continue your current medications  Remain on a Low carbohydrate diet, exercise daily  Check a sugar before breakfast and before dinner  On metformin take prn on his own   A1c 10.5% 12/01/2023    Hypertension  No added salt diet, do not and salt to your food  Try to exercise every other day for 30 minutes  Continue current medications    Poison Ivy dermatitis  Medrol dose pack  Triamcinolone cream     Problem List Items Addressed This Visit       Benign essential HTN    DM type 2 (diabetes mellitus, type 2) (Multi)    Hypertensive disorder     Other Visit Diagnoses       Lumbar pain    -  Primary     Relevant Medications    tiZANidine (Zanaflex) 2 mg tablet    methylPREDNISolone (Medrol Dospak) 4 mg tablets    Other Relevant Orders    POCT UA (Automated) docked device    XR lumbar spine 2-3 views    Left paraspinal back pain        Relevant Medications    methylPREDNISolone (Medrol Dospak) 4 mg tablets    Other Relevant Orders    XR lumbar spine 2-3 views                Clay Otero MD

## 2025-01-21 DIAGNOSIS — N52.9 ERECTILE DYSFUNCTION, UNSPECIFIED ERECTILE DYSFUNCTION TYPE: ICD-10-CM

## 2025-01-21 RX ORDER — SILDENAFIL 100 MG/1
100 TABLET, FILM COATED ORAL DAILY PRN
Qty: 30 TABLET | Refills: 1 | Status: SHIPPED | OUTPATIENT
Start: 2025-01-21 | End: 2025-03-22

## 2025-03-04 ENCOUNTER — APPOINTMENT (OUTPATIENT)
Dept: ORTHOPEDIC SURGERY | Facility: HOSPITAL | Age: 75
End: 2025-03-04
Payer: MEDICARE

## 2025-03-25 DIAGNOSIS — E11.9 TYPE 2 DIABETES MELLITUS WITHOUT COMPLICATION, WITHOUT LONG-TERM CURRENT USE OF INSULIN: ICD-10-CM

## 2025-03-26 RX ORDER — BLOOD-GLUCOSE CONTROL, NORMAL
EACH MISCELLANEOUS
Qty: 100 EACH | Refills: 3 | Status: SHIPPED | OUTPATIENT
Start: 2025-03-26

## 2025-04-06 NOTE — PROGRESS NOTES
Subjective   Patient ID: Bradly Infante is a 74 y.o. male    Chief Complaint: No chief complaint on file.       Last Surgery: No surgery found  Date of Last Surgery: No surgery found    HPI  Bradly Infante is a 74 y.o. right hand dominant male presenting for left shoulder pain    His left shoulder pain has worsened over the last 6 weeks. Prior to this it was tolerable. He did fall off of a step stool. He is struggling with sleep at night. He enjoys yard work and is avoiding ladders. His shoulder has not improved since his fall. Bench pressing and butterflies are painful.     He is a diabetic and is on medication for this.     11/12/24  Bradly Infante is a 74 y.o. male returning to the clinic for a follow up visit regarding his left shoulder.    4/6/25  Bradly Infante is a 74 y.o. male returning to the clinic for a follow up visit regarding his left shoulder.    Objective   Patient is a well-developed, well nourished male in no acute distress. Awake, alert and oriented x3, with appropriate mood. Breathes with normal chest rises. Eye exam reveals round pupils with clear sclera. Gait is steady.    Patient had full range of motion of bilateral elbows. 5 out of 5 bilateral wrist flexion and extension. Thumb extension positive bilaterally. Sensation was intact to light touch to median, ulnar, radial, and axillary nerves bilaterally. Positive radial pulse bilaterally. Cervical lymphadenopathy examined, and this was negative.    Examination of the right shoulder today reveals the skin to be intact. There is no sign any atrophy lesions or abrasions. There is no pain to palpation of the bony prominences. Range of motion of the right shoulder revealed 0-170° of forward elevation. 0-60° of external rotation. Internal rotation was to T12. +Weaver    Examination of the left shoulder reveals the skin to be intact. No sign any atrophy lesions or abrasions.  Active range of motion is 120° of forward elevation, passively correctable.   50° of external rotation. Internal rotation was to L-3. Abduction to 0-160°. 4/5 Stanton's testing. +Khurramkin's     Imaging:  MRI of the left shoulder taken 09/2024 personally ordered and interpreted by me show partial age related tearing. Some increased signal that is consistent with his fall. Small cyst in the bone.        Assessment/Plan   No diagnosis found.  Patient with left shoulder pain related to his rotator cuff as a direct result of his fall.   No full thickness tearing on MRI. There is nothing that requires him to have urgent surgery. We are going to try therapy and injections at this time. If he finds no relief with conservative management we will consider surgery further.     At this time, we had a very long discussion with the patient regarding the diagnosis. Rotator cuff disease is a spectrum of disorders ranging from rotator cuff tendinitis to full-thickness rotator cuff tears. We know that some patients over the age of 50 will have symptomatic rotator cuff tears just due to overuse and general wear and tear. In general, most patients who come in with complaints such as these will get better with therapy and injections alone. The therapy should be performed 2-3 times a day and should take about 10-15 minutes to perform each time.       He tolerated the injection well today in the LEFT shoulder. We will see how they do after the injection. They are aware that there can be a spike in glucose levels following an injection.    If they do not get sustained relief from the injection, consideration for a repeat injection versus advanced imaging of an MRI will be warranted as then they would have failed over 6 weeks of conservative management of injections, therapy, rest and over-the-counter pain medications.      No orders of the defined types were placed in this encounter.        Follow up in 3 months    Scribe Attestation  By signing my name below, IZita Scribe   attest that this documentation  has been prepared under the direction and in the presence of Chip Blunt MD.

## 2025-04-08 ENCOUNTER — APPOINTMENT (OUTPATIENT)
Dept: ORTHOPEDIC SURGERY | Facility: HOSPITAL | Age: 75
End: 2025-04-08
Payer: MEDICARE

## 2025-06-19 DIAGNOSIS — N52.9 ERECTILE DYSFUNCTION, UNSPECIFIED ERECTILE DYSFUNCTION TYPE: ICD-10-CM

## 2025-06-19 RX ORDER — SILDENAFIL 100 MG/1
100 TABLET, FILM COATED ORAL DAILY PRN
Qty: 30 TABLET | Refills: 1 | Status: SHIPPED | OUTPATIENT
Start: 2025-06-19 | End: 2025-08-18

## 2025-08-25 ENCOUNTER — APPOINTMENT (OUTPATIENT)
Dept: PRIMARY CARE | Facility: CLINIC | Age: 75
End: 2025-08-25
Payer: MEDICARE

## 2025-08-25 ENCOUNTER — HOSPITAL ENCOUNTER (OUTPATIENT)
Dept: RADIOLOGY | Facility: CLINIC | Age: 75
Discharge: HOME | End: 2025-08-25
Payer: MEDICARE

## 2025-08-25 VITALS
WEIGHT: 150 LBS | SYSTOLIC BLOOD PRESSURE: 124 MMHG | RESPIRATION RATE: 16 BRPM | TEMPERATURE: 97.7 F | HEART RATE: 74 BPM | DIASTOLIC BLOOD PRESSURE: 82 MMHG | HEIGHT: 69 IN | BODY MASS INDEX: 22.22 KG/M2

## 2025-08-25 DIAGNOSIS — I10 PRIMARY HYPERTENSION: ICD-10-CM

## 2025-08-25 DIAGNOSIS — M54.2 CERVICALGIA: ICD-10-CM

## 2025-08-25 DIAGNOSIS — R93.1 ELEVATED CORONARY ARTERY CALCIUM SCORE: ICD-10-CM

## 2025-08-25 DIAGNOSIS — E78.5 DYSLIPIDEMIA: ICD-10-CM

## 2025-08-25 DIAGNOSIS — Z00.00 ROUTINE GENERAL MEDICAL EXAMINATION AT HEALTH CARE FACILITY: Primary | ICD-10-CM

## 2025-08-25 DIAGNOSIS — R35.1 NOCTURIA: ICD-10-CM

## 2025-08-25 DIAGNOSIS — E55.9 VITAMIN D INSUFFICIENCY: ICD-10-CM

## 2025-08-25 DIAGNOSIS — E11.9 TYPE 2 DIABETES MELLITUS WITHOUT COMPLICATION, WITHOUT LONG-TERM CURRENT USE OF INSULIN: ICD-10-CM

## 2025-08-25 LAB
25(OH)D3 SERPL-MCNC: 53 NG/ML (ref 30–100)
ALT SERPL W P-5'-P-CCNC: 46 U/L (ref 14–59)
ANION GAP SERPL CALC-SCNC: 16 MMOL/L (ref 10–20)
AST SERPL W P-5'-P-CCNC: 15 U/L (ref 15–37)
BUN SERPL-MCNC: 17 MG/DL (ref 7–18)
CALCIUM SERPL-MCNC: 9.5 MG/DL (ref 8.5–10.1)
CHLORIDE SERPL-SCNC: 102 MMOL/L (ref 98–107)
CHOLEST SERPL-MCNC: 207 MG/DL (ref 0–199)
CHOLESTEROL/HDL RATIO: 3.5 (ref 4.2–7)
CO2 SERPL-SCNC: 26 MMOL/L (ref 21–32)
CREAT SERPL-MCNC: 0.99 MG/DL (ref 0.6–1.1)
EGFRCR SERPLBLD CKD-EPI 2021: 78 ML/MIN/1.73M*2
GLUCOSE SERPL-MCNC: 113 MG/DL (ref 74–100)
HBA1C MFR BLD: 6.8 %
HDLC SERPL-MCNC: 60 MG/DL (ref 40–59)
IS PATIENT FASTING: YES
LDLC SERPL DIRECT ASSAY-MCNC: 120 MG/DL (ref 0–100)
POTASSIUM SERPL-SCNC: 4.4 MMOL/L (ref 3.5–5.1)
PSA SERPL-MCNC: 1.6 NG/ML
SODIUM SERPL-SCNC: 140 MMOL/L (ref 136–145)
TRIGL SERPL-MCNC: 151 MG/DL

## 2025-08-25 PROCEDURE — 83036 HEMOGLOBIN GLYCOSYLATED A1C: CPT | Performed by: INTERNAL MEDICINE

## 2025-08-25 PROCEDURE — 83721 ASSAY OF BLOOD LIPOPROTEIN: CPT | Performed by: INTERNAL MEDICINE

## 2025-08-25 PROCEDURE — 1159F MED LIST DOCD IN RCRD: CPT | Performed by: INTERNAL MEDICINE

## 2025-08-25 PROCEDURE — 72050 X-RAY EXAM NECK SPINE 4/5VWS: CPT | Performed by: STUDENT IN AN ORGANIZED HEALTH CARE EDUCATION/TRAINING PROGRAM

## 2025-08-25 PROCEDURE — 84153 ASSAY OF PSA TOTAL: CPT | Performed by: INTERNAL MEDICINE

## 2025-08-25 PROCEDURE — 99214 OFFICE O/P EST MOD 30 MIN: CPT | Performed by: INTERNAL MEDICINE

## 2025-08-25 PROCEDURE — 1036F TOBACCO NON-USER: CPT | Performed by: INTERNAL MEDICINE

## 2025-08-25 PROCEDURE — 72050 X-RAY EXAM NECK SPINE 4/5VWS: CPT

## 2025-08-25 PROCEDURE — 1170F FXNL STATUS ASSESSED: CPT | Performed by: INTERNAL MEDICINE

## 2025-08-25 PROCEDURE — 80048 BASIC METABOLIC PNL TOTAL CA: CPT | Performed by: INTERNAL MEDICINE

## 2025-08-25 PROCEDURE — 1160F RVW MEDS BY RX/DR IN RCRD: CPT | Performed by: INTERNAL MEDICINE

## 2025-08-25 PROCEDURE — 82306 VITAMIN D 25 HYDROXY: CPT | Performed by: INTERNAL MEDICINE

## 2025-08-25 PROCEDURE — 84460 ALANINE AMINO (ALT) (SGPT): CPT | Performed by: INTERNAL MEDICINE

## 2025-08-25 PROCEDURE — 3079F DIAST BP 80-89 MM HG: CPT | Performed by: INTERNAL MEDICINE

## 2025-08-25 PROCEDURE — G2211 COMPLEX E/M VISIT ADD ON: HCPCS | Performed by: INTERNAL MEDICINE

## 2025-08-25 PROCEDURE — 80061 LIPID PANEL: CPT | Performed by: INTERNAL MEDICINE

## 2025-08-25 PROCEDURE — 3074F SYST BP LT 130 MM HG: CPT | Performed by: INTERNAL MEDICINE

## 2025-08-25 PROCEDURE — G0446 INTENS BEHAVE THER CARDIO DX: HCPCS | Performed by: INTERNAL MEDICINE

## 2025-08-25 PROCEDURE — G0439 PPPS, SUBSEQ VISIT: HCPCS | Performed by: INTERNAL MEDICINE

## 2025-08-25 PROCEDURE — 1125F AMNT PAIN NOTED PAIN PRSNT: CPT | Performed by: INTERNAL MEDICINE

## 2025-08-25 PROCEDURE — 99497 ADVNCD CARE PLAN 30 MIN: CPT | Performed by: INTERNAL MEDICINE

## 2025-08-25 PROCEDURE — 84450 TRANSFERASE (AST) (SGOT): CPT | Performed by: INTERNAL MEDICINE

## 2025-08-25 RX ORDER — BLOOD-GLUCOSE SENSOR
EACH MISCELLANEOUS
Qty: 2 EACH | Refills: 3 | Status: SHIPPED | OUTPATIENT
Start: 2025-08-25

## 2025-08-25 RX ORDER — METHYLPREDNISOLONE 4 MG/1
TABLET ORAL
Qty: 21 TABLET | Refills: 0 | Status: SHIPPED | OUTPATIENT
Start: 2025-08-25 | End: 2025-08-31

## 2025-08-25 RX ORDER — TIZANIDINE 2 MG/1
2 TABLET ORAL NIGHTLY
Qty: 10 TABLET | Refills: 0 | Status: SHIPPED | OUTPATIENT
Start: 2025-08-25 | End: 2025-09-04

## 2025-08-25 ASSESSMENT — ACTIVITIES OF DAILY LIVING (ADL)
GROCERY SHOPPING: INDEPENDENT
HEARING - LEFT EAR: FUNCTIONAL
FEEDING YOURSELF: INDEPENDENT
HEARING - RIGHT EAR: FUNCTIONAL
MANAGING FINANCES: INDEPENDENT
BATHING: INDEPENDENT
USING TRANSPORTATION: INDEPENDENT
DRESSING YOURSELF: INDEPENDENT
DOING HOUSEWORK: INDEPENDENT
EATING: INDEPENDENT
JUDGMENT_ADEQUATE_SAFELY_COMPLETE_DAILY_ACTIVITIES: YES
ADEQUATE_TO_COMPLETE_ADL: YES
PREPARING MEALS: INDEPENDENT
TOILETING: INDEPENDENT
GROOMING: INDEPENDENT
WALKS IN HOME: INDEPENDENT
PILL BOX USED: NO
TAKING MEDICATION: INDEPENDENT
NEEDS ASSISTANCE WITH FOOD: INDEPENDENT
PATIENT'S MEMORY ADEQUATE TO SAFELY COMPLETE DAILY ACTIVITIES?: YES
STIL DRIVING: YES
USING TELEPHONE: INDEPENDENT

## 2025-08-25 ASSESSMENT — ENCOUNTER SYMPTOMS
LOSS OF SENSATION IN FEET: 0
OCCASIONAL FEELINGS OF UNSTEADINESS: 0
DEPRESSION: 0

## 2025-08-25 ASSESSMENT — GERIATRIC MINI NUTRITIONAL ASSESSMENT (MNA)
E NEUROPSYCHOLOGICAL PROBLEMS: NO PSYCHOLOGICAL PROBLEMS
A HAS FOOD INTAKE DECLINED OVER THE PAST 3 MONTHS DUE TO LOSS OF APPETITE, DIGESTIVE PROBLEMS, CHEWING OR SWALLOWING DIFFICULTIES?: NO DECREASE IN FOOD INTAKE
C GENERAL MOBILITY: GOES OUT
B WEIGHT LOSS DURING THE LAST 3 MONTHS: NO WEIGHT LOSS
D HAS SUFFERED PSYCHOLOGICAL STRESS OR ACUTE DISEASE IN THE PAST 3 MONTHS?: NO

## 2025-08-25 ASSESSMENT — COLUMBIA-SUICIDE SEVERITY RATING SCALE - C-SSRS
1. IN THE PAST MONTH, HAVE YOU WISHED YOU WERE DEAD OR WISHED YOU COULD GO TO SLEEP AND NOT WAKE UP?: NO
6. HAVE YOU EVER DONE ANYTHING, STARTED TO DO ANYTHING, OR PREPARED TO DO ANYTHING TO END YOUR LIFE?: NO
2. HAVE YOU ACTUALLY HAD ANY THOUGHTS OF KILLING YOURSELF?: NO

## 2025-08-25 ASSESSMENT — ANXIETY QUESTIONNAIRES
1. FEELING NERVOUS, ANXIOUS, OR ON EDGE: NOT AT ALL
GAD7 TOTAL SCORE: 0
2. NOT BEING ABLE TO STOP OR CONTROL WORRYING: NOT AT ALL
IF YOU CHECKED OFF ANY PROBLEMS ON THIS QUESTIONNAIRE, HOW DIFFICULT HAVE THESE PROBLEMS MADE IT FOR YOU TO DO YOUR WORK, TAKE CARE OF THINGS AT HOME, OR GET ALONG WITH OTHER PEOPLE: NOT DIFFICULT AT ALL
4. TROUBLE RELAXING: NOT AT ALL
6. BECOMING EASILY ANNOYED OR IRRITABLE: NOT AT ALL
3. WORRYING TOO MUCH ABOUT DIFFERENT THINGS: NOT AT ALL
7. FEELING AFRAID AS IF SOMETHING AWFUL MIGHT HAPPEN: NOT AT ALL
5. BEING SO RESTLESS THAT IT IS HARD TO SIT STILL: NOT AT ALL

## 2025-08-25 ASSESSMENT — PATIENT HEALTH QUESTIONNAIRE - PHQ9
2. FEELING DOWN, DEPRESSED OR HOPELESS: NOT AT ALL
SUM OF ALL RESPONSES TO PHQ9 QUESTIONS 1 AND 2: 0
1. LITTLE INTEREST OR PLEASURE IN DOING THINGS: NOT AT ALL

## 2025-08-25 ASSESSMENT — PAIN SCALES - GENERAL: PAINLEVEL_OUTOF10: 4

## 2025-09-05 ENCOUNTER — OFFICE VISIT (OUTPATIENT)
Dept: ORTHOPEDIC SURGERY | Facility: CLINIC | Age: 75
End: 2025-09-05
Payer: MEDICARE

## 2025-09-05 DIAGNOSIS — M54.12 CERVICAL RADICULOPATHY: ICD-10-CM

## 2025-09-05 DIAGNOSIS — M47.812 CERVICAL SPONDYLOSIS: Primary | ICD-10-CM

## 2025-09-05 PROCEDURE — 1036F TOBACCO NON-USER: CPT | Performed by: ORTHOPAEDIC SURGERY

## 2025-09-05 PROCEDURE — 1159F MED LIST DOCD IN RCRD: CPT | Performed by: ORTHOPAEDIC SURGERY

## 2025-09-05 PROCEDURE — 99213 OFFICE O/P EST LOW 20 MIN: CPT | Performed by: ORTHOPAEDIC SURGERY

## 2025-09-23 ENCOUNTER — APPOINTMENT (OUTPATIENT)
Dept: PRIMARY CARE | Facility: CLINIC | Age: 75
End: 2025-09-23
Payer: MEDICARE

## 2026-08-12 ENCOUNTER — APPOINTMENT (OUTPATIENT)
Dept: PRIMARY CARE | Facility: CLINIC | Age: 76
End: 2026-08-12
Payer: MEDICARE